# Patient Record
Sex: MALE | Race: WHITE | NOT HISPANIC OR LATINO
[De-identification: names, ages, dates, MRNs, and addresses within clinical notes are randomized per-mention and may not be internally consistent; named-entity substitution may affect disease eponyms.]

---

## 2019-06-03 PROBLEM — Z00.00 ENCOUNTER FOR PREVENTIVE HEALTH EXAMINATION: Status: ACTIVE | Noted: 2019-06-03

## 2019-08-14 ENCOUNTER — APPOINTMENT (OUTPATIENT)
Dept: INTERNAL MEDICINE | Facility: CLINIC | Age: 84
End: 2019-08-14
Payer: MEDICARE

## 2019-08-14 VITALS
SYSTOLIC BLOOD PRESSURE: 100 MMHG | WEIGHT: 165 LBS | DIASTOLIC BLOOD PRESSURE: 60 MMHG | HEART RATE: 91 BPM | OXYGEN SATURATION: 95 %

## 2019-08-14 PROCEDURE — 36415 COLL VENOUS BLD VENIPUNCTURE: CPT

## 2019-08-14 PROCEDURE — 99213 OFFICE O/P EST LOW 20 MIN: CPT | Mod: 25

## 2019-08-14 NOTE — PHYSICAL EXAM
[No Acute Distress] : no acute distress [Normal Sclera/Conjunctiva] : normal sclera/conjunctiva [Normal Outer Ear/Nose] : the outer ears and nose were normal in appearance [No JVD] : no jugular venous distention [No Lymphadenopathy] : no lymphadenopathy [No Respiratory Distress] : no respiratory distress  [No Accessory Muscle Use] : no accessory muscle use [Clear to Auscultation] : lungs were clear to auscultation bilaterally [Normal Rate] : normal rate  [No Edema] : there was no peripheral edema [No Extremity Clubbing/Cyanosis] : no extremity clubbing/cyanosis [Soft] : abdomen soft [Non Tender] : non-tender [Non-distended] : non-distended [de-identified] : vr 62

## 2019-08-14 NOTE — REVIEW OF SYSTEMS
[Fever] : no fever [Chills] : no chills [Chest Pain] : no chest pain [Claudication] : no  leg claudication [Palpitations] : no palpitations [Shortness Of Breath] : no shortness of breath [Wheezing] : no wheezing [FreeTextEntry7] : has 3 BM daily  no diarrhea [Abdominal Pain] : no abdominal pain

## 2019-08-16 LAB
ALBUMIN SERPL ELPH-MCNC: 4.3 G/DL
ALP BLD-CCNC: 90 U/L
ALT SERPL-CCNC: 16 U/L
ANION GAP SERPL CALC-SCNC: 13 MMOL/L
AST SERPL-CCNC: 19 U/L
BASOPHILS # BLD AUTO: 0.05 K/UL
BASOPHILS NFR BLD AUTO: 0.8 %
BILIRUB SERPL-MCNC: 0.6 MG/DL
BUN SERPL-MCNC: 19 MG/DL
CALCIUM SERPL-MCNC: 9.6 MG/DL
CHLORIDE SERPL-SCNC: 99 MMOL/L
CHOLEST SERPL-MCNC: 151 MG/DL
CHOLEST/HDLC SERPL: 2.3 RATIO
CO2 SERPL-SCNC: 22 MMOL/L
CREAT SERPL-MCNC: 0.91 MG/DL
EOSINOPHIL # BLD AUTO: 0.1 K/UL
EOSINOPHIL NFR BLD AUTO: 1.6 %
ESTIMATED AVERAGE GLUCOSE: 131 MG/DL
GLUCOSE SERPL-MCNC: 127 MG/DL
HBA1C MFR BLD HPLC: 6.2 %
HCT VFR BLD CALC: 37.9 %
HDLC SERPL-MCNC: 67 MG/DL
HGB BLD-MCNC: 12.2 G/DL
IMM GRANULOCYTES NFR BLD AUTO: 3.6 %
LDLC SERPL CALC-MCNC: 57 MG/DL
LYMPHOCYTES # BLD AUTO: 1.01 K/UL
LYMPHOCYTES NFR BLD AUTO: 15.9 %
MAN DIFF?: NORMAL
MCHC RBC-ENTMCNC: 32.2 GM/DL
MCHC RBC-ENTMCNC: 32.5 PG
MCV RBC AUTO: 101.1 FL
MONOCYTES # BLD AUTO: 0.67 K/UL
MONOCYTES NFR BLD AUTO: 10.6 %
NEUTROPHILS # BLD AUTO: 4.28 K/UL
NEUTROPHILS NFR BLD AUTO: 67.5 %
PLATELET # BLD AUTO: 238 K/UL
POTASSIUM SERPL-SCNC: 4.6 MMOL/L
PROT SERPL-MCNC: 6.9 G/DL
RBC # BLD: 3.75 M/UL
RBC # FLD: 13.8 %
SODIUM SERPL-SCNC: 134 MMOL/L
TRIGL SERPL-MCNC: 136 MG/DL
TSH SERPL-ACNC: 1.72 UIU/ML
WBC # FLD AUTO: 6.34 K/UL

## 2019-10-11 ENCOUNTER — APPOINTMENT (OUTPATIENT)
Dept: INTERNAL MEDICINE | Facility: CLINIC | Age: 84
End: 2019-10-11

## 2019-10-11 ENCOUNTER — APPOINTMENT (OUTPATIENT)
Dept: INTERNAL MEDICINE | Facility: CLINIC | Age: 84
End: 2019-10-11
Payer: MEDICARE

## 2019-10-11 PROCEDURE — G0008: CPT

## 2019-10-11 PROCEDURE — 90662 IIV NO PRSV INCREASED AG IM: CPT

## 2019-11-15 ENCOUNTER — RX RENEWAL (OUTPATIENT)
Age: 84
End: 2019-11-15

## 2019-11-15 ENCOUNTER — APPOINTMENT (OUTPATIENT)
Dept: INTERNAL MEDICINE | Facility: CLINIC | Age: 84
End: 2019-11-15
Payer: MEDICARE

## 2019-11-15 VITALS — SYSTOLIC BLOOD PRESSURE: 134 MMHG | DIASTOLIC BLOOD PRESSURE: 80 MMHG

## 2019-11-15 VITALS
BODY MASS INDEX: 25.9 KG/M2 | WEIGHT: 165 LBS | HEIGHT: 67 IN | OXYGEN SATURATION: 95 % | TEMPERATURE: 97.1 F | HEART RATE: 74 BPM

## 2019-11-15 DIAGNOSIS — Z82.49 FAMILY HISTORY OF ISCHEMIC HEART DISEASE AND OTHER DISEASES OF THE CIRCULATORY SYSTEM: ICD-10-CM

## 2019-11-15 PROCEDURE — 36415 COLL VENOUS BLD VENIPUNCTURE: CPT

## 2019-11-15 PROCEDURE — 99213 OFFICE O/P EST LOW 20 MIN: CPT | Mod: 25

## 2019-11-15 NOTE — REVIEW OF SYSTEMS
[Fever] : no fever [Chills] : no chills [Earache] : no earache [Sore Throat] : no sore throat [Hoarseness] : no hoarseness [Chest Pain] : no chest pain [Palpitations] : no palpitations [Lower Ext Edema] : no lower extremity edema [Shortness Of Breath] : no shortness of breath [Wheezing] : no wheezing [Cough] : no cough [Abdominal Pain] : no abdominal pain [Heartburn] : no heartburn

## 2019-11-15 NOTE — PHYSICAL EXAM
[No Acute Distress] : no acute distress [Normal Outer Ear/Nose] : the outer ears and nose were normal in appearance [No JVD] : no jugular venous distention [Supple] : supple [No Respiratory Distress] : no respiratory distress  [No Lymphadenopathy] : no lymphadenopathy [No Accessory Muscle Use] : no accessory muscle use [Clear to Auscultation] : lungs were clear to auscultation bilaterally [Normal Rate] : normal rate  [Regular Rhythm] : with a regular rhythm [Normal S1, S2] : normal S1 and S2 [No Extremity Clubbing/Cyanosis] : no extremity clubbing/cyanosis [No Edema] : there was no peripheral edema [Non Tender] : non-tender [Soft] : abdomen soft [Non-distended] : non-distended

## 2019-11-16 LAB
ALBUMIN SERPL ELPH-MCNC: 4 G/DL
ALP BLD-CCNC: 88 U/L
ALT SERPL-CCNC: 13 U/L
ANION GAP SERPL CALC-SCNC: 14 MMOL/L
AST SERPL-CCNC: 18 U/L
BASOPHILS # BLD AUTO: 0.04 K/UL
BASOPHILS NFR BLD AUTO: 0.6 %
BILIRUB SERPL-MCNC: 0.6 MG/DL
BUN SERPL-MCNC: 18 MG/DL
CALCIUM SERPL-MCNC: 9.3 MG/DL
CHLORIDE SERPL-SCNC: 97 MMOL/L
CHOLEST SERPL-MCNC: 143 MG/DL
CHOLEST/HDLC SERPL: 2.6 RATIO
CO2 SERPL-SCNC: 26 MMOL/L
CREAT SERPL-MCNC: 0.87 MG/DL
EOSINOPHIL # BLD AUTO: 0.11 K/UL
EOSINOPHIL NFR BLD AUTO: 1.5 %
GLUCOSE SERPL-MCNC: 124 MG/DL
HCT VFR BLD CALC: 37.9 %
HDLC SERPL-MCNC: 55 MG/DL
HGB BLD-MCNC: 12.1 G/DL
IMM GRANULOCYTES NFR BLD AUTO: 4.6 %
LDLC SERPL CALC-MCNC: 62 MG/DL
LYMPHOCYTES # BLD AUTO: 1.26 K/UL
LYMPHOCYTES NFR BLD AUTO: 17.7 %
MAN DIFF?: NORMAL
MCHC RBC-ENTMCNC: 31.9 GM/DL
MCHC RBC-ENTMCNC: 32.3 PG
MCV RBC AUTO: 101.1 FL
MONOCYTES # BLD AUTO: 0.85 K/UL
MONOCYTES NFR BLD AUTO: 12 %
NEUTROPHILS # BLD AUTO: 4.51 K/UL
NEUTROPHILS NFR BLD AUTO: 63.6 %
PLATELET # BLD AUTO: 270 K/UL
POTASSIUM SERPL-SCNC: 4.8 MMOL/L
PROT SERPL-MCNC: 6.8 G/DL
RBC # BLD: 3.75 M/UL
RBC # FLD: 13.2 %
SODIUM SERPL-SCNC: 137 MMOL/L
TRIGL SERPL-MCNC: 132 MG/DL
WBC # FLD AUTO: 7.1 K/UL

## 2019-11-18 ENCOUNTER — RX RENEWAL (OUTPATIENT)
Age: 84
End: 2019-11-18

## 2019-11-18 LAB — TSH SERPL-ACNC: 1.18 UIU/ML

## 2020-02-21 ENCOUNTER — APPOINTMENT (OUTPATIENT)
Dept: INTERNAL MEDICINE | Facility: CLINIC | Age: 85
End: 2020-02-21

## 2020-08-03 ENCOUNTER — APPOINTMENT (OUTPATIENT)
Dept: INTERNAL MEDICINE | Facility: CLINIC | Age: 85
End: 2020-08-03
Payer: MEDICARE

## 2020-08-03 VITALS
BODY MASS INDEX: 24.17 KG/M2 | HEART RATE: 90 BPM | WEIGHT: 154 LBS | OXYGEN SATURATION: 96 % | HEIGHT: 67 IN | RESPIRATION RATE: 16 BRPM

## 2020-08-03 VITALS — DIASTOLIC BLOOD PRESSURE: 78 MMHG | SYSTOLIC BLOOD PRESSURE: 132 MMHG

## 2020-08-03 PROCEDURE — 99213 OFFICE O/P EST LOW 20 MIN: CPT | Mod: 25

## 2020-08-03 PROCEDURE — G0444 DEPRESSION SCREEN ANNUAL: CPT | Mod: 59

## 2020-08-03 PROCEDURE — 36415 COLL VENOUS BLD VENIPUNCTURE: CPT

## 2020-08-03 NOTE — HEALTH RISK ASSESSMENT
[No] : In the past 12 months have you used drugs other than those required for medical reasons? No [No falls in past year] : Patient reported no falls in the past year [Assistive Device] : Patient uses an assistive device [0] : 2) Feeling down, depressed, or hopeless: Not at all (0) [] : No [Audit-CScore] : 0 [[id number]] : 2

## 2020-08-03 NOTE — REVIEW OF SYSTEMS
[Chills] : no chills [Fever] : no fever [Discharge] : no discharge [Vision Problems] : no vision problems [Chest Pain] : no chest pain [Palpitations] : no palpitations [Shortness Of Breath] : no shortness of breath [Lower Ext Edema] : no lower extremity edema [Wheezing] : no wheezing [Cough] : no cough [Abdominal Pain] : no abdominal pain

## 2020-08-03 NOTE — PHYSICAL EXAM
[No Acute Distress] : no acute distress [Normal Sclera/Conjunctiva] : normal sclera/conjunctiva [Normal Outer Ear/Nose] : the outer ears and nose were normal in appearance [No JVD] : no jugular venous distention [No Lymphadenopathy] : no lymphadenopathy [No Accessory Muscle Use] : no accessory muscle use [No Respiratory Distress] : no respiratory distress  [Clear to Auscultation] : lungs were clear to auscultation bilaterally [Normal Rate] : normal rate  [Regular Rhythm] : with a regular rhythm [Normal S1, S2] : normal S1 and S2 [No Extremity Clubbing/Cyanosis] : no extremity clubbing/cyanosis [No Edema] : there was no peripheral edema [Soft] : abdomen soft [Non Tender] : non-tender [Non-distended] : non-distended

## 2020-08-04 LAB
ALBUMIN SERPL ELPH-MCNC: 4.1 G/DL
ALP BLD-CCNC: 85 U/L
ALT SERPL-CCNC: 22 U/L
ANION GAP SERPL CALC-SCNC: 13 MMOL/L
AST SERPL-CCNC: 30 U/L
BASOPHILS # BLD AUTO: 0.02 K/UL
BASOPHILS NFR BLD AUTO: 0.3 %
BILIRUB SERPL-MCNC: 1.2 MG/DL
BUN SERPL-MCNC: 19 MG/DL
CALCIUM SERPL-MCNC: 9.3 MG/DL
CHLORIDE SERPL-SCNC: 97 MMOL/L
CHOLEST SERPL-MCNC: 111 MG/DL
CHOLEST/HDLC SERPL: 1.8 RATIO
CO2 SERPL-SCNC: 23 MMOL/L
CREAT SERPL-MCNC: 0.87 MG/DL
EOSINOPHIL # BLD AUTO: 0.05 K/UL
EOSINOPHIL NFR BLD AUTO: 0.7 %
ESTIMATED AVERAGE GLUCOSE: 117 MG/DL
GLUCOSE SERPL-MCNC: 127 MG/DL
HBA1C MFR BLD HPLC: 5.7 %
HCT VFR BLD CALC: 39.8 %
HDLC SERPL-MCNC: 63 MG/DL
HGB BLD-MCNC: 12.8 G/DL
IMM GRANULOCYTES NFR BLD AUTO: 1.9 %
LDLC SERPL CALC-MCNC: 28 MG/DL
LYMPHOCYTES # BLD AUTO: 1.13 K/UL
LYMPHOCYTES NFR BLD AUTO: 15.4 %
MAN DIFF?: NORMAL
MCHC RBC-ENTMCNC: 32.2 GM/DL
MCHC RBC-ENTMCNC: 32.9 PG
MCV RBC AUTO: 102.3 FL
MONOCYTES # BLD AUTO: 0.71 K/UL
MONOCYTES NFR BLD AUTO: 9.7 %
NEUTROPHILS # BLD AUTO: 5.3 K/UL
NEUTROPHILS NFR BLD AUTO: 72 %
PLATELET # BLD AUTO: 207 K/UL
POTASSIUM SERPL-SCNC: 5.2 MMOL/L
PROT SERPL-MCNC: 6.5 G/DL
RBC # BLD: 3.89 M/UL
RBC # FLD: 14 %
SODIUM SERPL-SCNC: 134 MMOL/L
T4 FREE SERPL-MCNC: 1.4 NG/DL
TRIGL SERPL-MCNC: 100 MG/DL
TSH SERPL-ACNC: 1.16 UIU/ML
WBC # FLD AUTO: 7.35 K/UL

## 2020-10-13 ENCOUNTER — APPOINTMENT (OUTPATIENT)
Dept: INTERNAL MEDICINE | Facility: CLINIC | Age: 85
End: 2020-10-13
Payer: MEDICARE

## 2020-10-13 VITALS
OXYGEN SATURATION: 95 % | SYSTOLIC BLOOD PRESSURE: 130 MMHG | HEART RATE: 103 BPM | DIASTOLIC BLOOD PRESSURE: 68 MMHG | BODY MASS INDEX: 24.17 KG/M2 | WEIGHT: 154 LBS | TEMPERATURE: 98 F | HEIGHT: 67 IN

## 2020-10-13 DIAGNOSIS — Z23 ENCOUNTER FOR IMMUNIZATION: ICD-10-CM

## 2020-10-13 PROCEDURE — G0009: CPT

## 2020-10-13 PROCEDURE — 90662 IIV NO PRSV INCREASED AG IM: CPT

## 2020-10-13 PROCEDURE — 90732 PPSV23 VACC 2 YRS+ SUBQ/IM: CPT

## 2020-10-13 PROCEDURE — G0008: CPT

## 2020-10-13 PROCEDURE — 36415 COLL VENOUS BLD VENIPUNCTURE: CPT

## 2020-10-13 PROCEDURE — 99214 OFFICE O/P EST MOD 30 MIN: CPT | Mod: 25

## 2020-10-13 NOTE — PHYSICAL EXAM
[No Acute Distress] : no acute distress [Normal Sclera/Conjunctiva] : normal sclera/conjunctiva [Normal Outer Ear/Nose] : the outer ears and nose were normal in appearance [No JVD] : no jugular venous distention [No Lymphadenopathy] : no lymphadenopathy [Supple] : supple [No Respiratory Distress] : no respiratory distress  [No Accessory Muscle Use] : no accessory muscle use [Clear to Auscultation] : lungs were clear to auscultation bilaterally [No Extremity Clubbing/Cyanosis] : no extremity clubbing/cyanosis [Soft] : abdomen soft [Non Tender] : non-tender [Non-distended] : non-distended [de-identified] : angy rr  [de-identified] : bilateral 1  + ankle edema

## 2020-10-13 NOTE — HISTORY OF PRESENT ILLNESS
[de-identified] : Pt comes for FBW and med renewal and vaccinations  Has been having some ankle swelling but no sob or chest pain or palpitations

## 2020-10-13 NOTE — REVIEW OF SYSTEMS
[Lower Ext Edema] : lower extremity edema [Fever] : no fever [Chills] : no chills [Chest Pain] : no chest pain [Palpitations] : no palpitations [Shortness Of Breath] : no shortness of breath [Wheezing] : no wheezing [Cough] : no cough [Abdominal Pain] : no abdominal pain

## 2020-10-14 LAB
ALBUMIN SERPL ELPH-MCNC: 4 G/DL
ALP BLD-CCNC: 87 U/L
ALT SERPL-CCNC: 24 U/L
ANION GAP SERPL CALC-SCNC: 13 MMOL/L
AST SERPL-CCNC: 25 U/L
BASOPHILS # BLD AUTO: 0.03 K/UL
BASOPHILS NFR BLD AUTO: 0.3 %
BILIRUB SERPL-MCNC: 0.7 MG/DL
BUN SERPL-MCNC: 17 MG/DL
CALCIUM SERPL-MCNC: 9 MG/DL
CHLORIDE SERPL-SCNC: 98 MMOL/L
CHOLEST SERPL-MCNC: 122 MG/DL
CHOLEST/HDLC SERPL: 2 RATIO
CO2 SERPL-SCNC: 26 MMOL/L
CREAT SERPL-MCNC: 0.8 MG/DL
EOSINOPHIL # BLD AUTO: 0.07 K/UL
EOSINOPHIL NFR BLD AUTO: 0.8 %
ESTIMATED AVERAGE GLUCOSE: 114 MG/DL
GLUCOSE SERPL-MCNC: 105 MG/DL
HBA1C MFR BLD HPLC: 5.6 %
HCT VFR BLD CALC: 37.1 %
HDLC SERPL-MCNC: 62 MG/DL
HGB BLD-MCNC: 11.7 G/DL
IMM GRANULOCYTES NFR BLD AUTO: 1 %
LDLC SERPL CALC-MCNC: 38 MG/DL
LYMPHOCYTES # BLD AUTO: 0.9 K/UL
LYMPHOCYTES NFR BLD AUTO: 10.1 %
MAN DIFF?: NORMAL
MCHC RBC-ENTMCNC: 31.5 GM/DL
MCHC RBC-ENTMCNC: 33 PG
MCV RBC AUTO: 104.5 FL
MONOCYTES # BLD AUTO: 0.98 K/UL
MONOCYTES NFR BLD AUTO: 11 %
NEUTROPHILS # BLD AUTO: 6.86 K/UL
NEUTROPHILS NFR BLD AUTO: 76.8 %
PLATELET # BLD AUTO: 233 K/UL
POTASSIUM SERPL-SCNC: 4.1 MMOL/L
PROT SERPL-MCNC: 6.3 G/DL
RBC # BLD: 3.55 M/UL
RBC # FLD: 13.6 %
SODIUM SERPL-SCNC: 136 MMOL/L
TRIGL SERPL-MCNC: 111 MG/DL
TSH SERPL-ACNC: 1.52 UIU/ML
WBC # FLD AUTO: 8.93 K/UL

## 2021-01-13 ENCOUNTER — APPOINTMENT (OUTPATIENT)
Dept: INTERNAL MEDICINE | Facility: CLINIC | Age: 86
End: 2021-01-13
Payer: MEDICARE

## 2021-01-13 VITALS
BODY MASS INDEX: 24.17 KG/M2 | TEMPERATURE: 97.8 F | OXYGEN SATURATION: 98 % | HEART RATE: 74 BPM | WEIGHT: 154 LBS | HEIGHT: 67 IN

## 2021-01-13 VITALS — SYSTOLIC BLOOD PRESSURE: 128 MMHG | DIASTOLIC BLOOD PRESSURE: 68 MMHG

## 2021-01-13 DIAGNOSIS — F41.9 ANXIETY DISORDER, UNSPECIFIED: ICD-10-CM

## 2021-01-13 PROCEDURE — 99214 OFFICE O/P EST MOD 30 MIN: CPT | Mod: 25

## 2021-01-13 PROCEDURE — 36415 COLL VENOUS BLD VENIPUNCTURE: CPT

## 2021-01-13 NOTE — REVIEW OF SYSTEMS
[Fever] : no fever [Chills] : no chills [Chest Pain] : no chest pain [Palpitations] : no palpitations [Lower Ext Edema] : no lower extremity edema [Shortness Of Breath] : no shortness of breath [Wheezing] : no wheezing [Cough] : no cough [Abdominal Pain] : no abdominal pain [Depression] : no depression [Easy Bleeding] : no easy bleeding [Easy Bruising] : no easy bruising

## 2021-01-13 NOTE — HISTORY OF PRESENT ILLNESS
[de-identified] : Pt comes for FBW and med renewal  NO further leg swelling with twice weekly furosemide

## 2021-01-13 NOTE — PHYSICAL EXAM
[No Acute Distress] : no acute distress [No JVD] : no jugular venous distention [No Respiratory Distress] : no respiratory distress  [No Accessory Muscle Use] : no accessory muscle use [Clear to Auscultation] : lungs were clear to auscultation bilaterally [Normal Rate] : normal rate  [Regular Rhythm] : with a regular rhythm [No Edema] : there was no peripheral edema [No Extremity Clubbing/Cyanosis] : no extremity clubbing/cyanosis [Soft] : abdomen soft [Non Tender] : non-tender [Non-distended] : non-distended [No Masses] : no abdominal mass palpated [Normal Affect] : the affect was normal [de-identified] : dry skin of legs

## 2021-01-14 LAB
25(OH)D3 SERPL-MCNC: 31.9 NG/ML
ALBUMIN SERPL ELPH-MCNC: 3.8 G/DL
ALP BLD-CCNC: 93 U/L
ALT SERPL-CCNC: 16 U/L
ANION GAP SERPL CALC-SCNC: 13 MMOL/L
AST SERPL-CCNC: 19 U/L
BASOPHILS # BLD AUTO: 0.03 K/UL
BASOPHILS NFR BLD AUTO: 0.5 %
BILIRUB SERPL-MCNC: 1 MG/DL
BUN SERPL-MCNC: 19 MG/DL
CALCIUM SERPL-MCNC: 9.3 MG/DL
CHLORIDE SERPL-SCNC: 98 MMOL/L
CHOLEST SERPL-MCNC: 130 MG/DL
CO2 SERPL-SCNC: 25 MMOL/L
CREAT SERPL-MCNC: 0.99 MG/DL
EOSINOPHIL # BLD AUTO: 0.06 K/UL
EOSINOPHIL NFR BLD AUTO: 0.9 %
ESTIMATED AVERAGE GLUCOSE: 123 MG/DL
GLUCOSE SERPL-MCNC: 116 MG/DL
HBA1C MFR BLD HPLC: 5.9 %
HCT VFR BLD CALC: 36.2 %
HDLC SERPL-MCNC: 59 MG/DL
HGB BLD-MCNC: 11.4 G/DL
IMM GRANULOCYTES NFR BLD AUTO: 3.7 %
LDLC SERPL CALC-MCNC: 51 MG/DL
LYMPHOCYTES # BLD AUTO: 1.06 K/UL
LYMPHOCYTES NFR BLD AUTO: 16.2 %
MAN DIFF?: NORMAL
MCHC RBC-ENTMCNC: 31.5 GM/DL
MCHC RBC-ENTMCNC: 32.5 PG
MCV RBC AUTO: 103.1 FL
MONOCYTES # BLD AUTO: 0.81 K/UL
MONOCYTES NFR BLD AUTO: 12.4 %
NEUTROPHILS # BLD AUTO: 4.33 K/UL
NEUTROPHILS NFR BLD AUTO: 66.3 %
NONHDLC SERPL-MCNC: 71 MG/DL
PLATELET # BLD AUTO: 189 K/UL
POTASSIUM SERPL-SCNC: 4.9 MMOL/L
PROT SERPL-MCNC: 6.7 G/DL
RBC # BLD: 3.51 M/UL
RBC # FLD: 13.7 %
SODIUM SERPL-SCNC: 137 MMOL/L
TRIGL SERPL-MCNC: 100 MG/DL
TSH SERPL-ACNC: 1.65 UIU/ML
WBC # FLD AUTO: 6.53 K/UL

## 2021-03-27 ENCOUNTER — EMERGENCY (EMERGENCY)
Facility: HOSPITAL | Age: 86
LOS: 1 days | Discharge: HOME | End: 2021-03-27
Attending: STUDENT IN AN ORGANIZED HEALTH CARE EDUCATION/TRAINING PROGRAM | Admitting: STUDENT IN AN ORGANIZED HEALTH CARE EDUCATION/TRAINING PROGRAM
Payer: MEDICARE

## 2021-03-27 VITALS
WEIGHT: 179.9 LBS | DIASTOLIC BLOOD PRESSURE: 62 MMHG | TEMPERATURE: 98 F | SYSTOLIC BLOOD PRESSURE: 116 MMHG | OXYGEN SATURATION: 95 % | RESPIRATION RATE: 20 BRPM | HEART RATE: 95 BPM | HEIGHT: 71 IN

## 2021-03-27 VITALS
TEMPERATURE: 98 F | DIASTOLIC BLOOD PRESSURE: 62 MMHG | HEART RATE: 93 BPM | OXYGEN SATURATION: 97 % | SYSTOLIC BLOOD PRESSURE: 125 MMHG | RESPIRATION RATE: 19 BRPM

## 2021-03-27 PROCEDURE — 99284 EMERGENCY DEPT VISIT MOD MDM: CPT | Mod: 25

## 2021-03-27 PROCEDURE — 73030 X-RAY EXAM OF SHOULDER: CPT

## 2021-03-27 PROCEDURE — 99284 EMERGENCY DEPT VISIT MOD MDM: CPT

## 2021-03-27 PROCEDURE — 70450 CT HEAD/BRAIN W/O DYE: CPT

## 2021-03-27 PROCEDURE — 73030 X-RAY EXAM OF SHOULDER: CPT | Mod: 26,RT

## 2021-03-27 PROCEDURE — 70450 CT HEAD/BRAIN W/O DYE: CPT | Mod: 26,MH

## 2021-03-27 NOTE — ED PROVIDER NOTE - PATIENT PORTAL LINK FT
You can access the FollowMyHealth Patient Portal offered by [facility] by registering at the following website: [email or web address]. By joining [employer]’s FollowMyHealth portal, you will also be able to view your health information using other applications (apps) compatible with our system.

## 2021-03-27 NOTE — ED PROVIDER NOTE - PHYSICAL EXAMINATION
Head: NC/AT with full ROM of c-spine  Hips and pelvis stable  Full ROM of b/l UE and LE  Full ROM of b/l elbow, no deformity, swelling, or laceration.  Full ROM of b/l shoulder. no deformity, swelling, or ecchymosis.  Small abrasion to right elbow

## 2021-03-27 NOTE — ED PROVIDER NOTE - PROGRESS NOTE DETAILS
Results of x-ray and CT discussed with patient's daughter, Mayra, and copies of all reports given.  Mayra was made aware of CT showing 2cm meningioma.  She is aware that this must be followed up with PMD for repeat imaging.  Daughter expressed understanding

## 2021-03-27 NOTE — ED ADULT TRIAGE NOTE - CHIEF COMPLAINT QUOTE
Fell 1 hour ago. Unwitnessed. Daughter found pt besides the bed. C/O right shoulder, bilat elbow pain.

## 2021-03-27 NOTE — ED PROVIDER NOTE - CLINICAL SUMMARY MEDICAL DECISION MAKING FREE TEXT BOX
89 year old male with unwitnessed fall at home, c/o mild shoulder and elbow pain.  Not taking anticoagulants.  CT head, x-ray, analgesia, reassess

## 2021-03-27 NOTE — ED PROVIDER NOTE - CROS ED MUSC ALL NEG
Office : [phone]     Fax :[phone]       Nephrology Consult Note      Patient's Name: Sukhjinder Ervin  4:34 PM  2/3/2021    Reason for Consult:  KAMILA , hypernatremia       Requesting Physician:  Kar Cross MD      Chief Complaint:    Chief Complaint   Patient presents with    Altered Mental Status     pt brought in by private ems, from [facility]. Pt has recently +COVID, [address] reports altered mental status. Pt noted with sediment, cloudy urine in her garcia          History of Present iIlness:    Sukhjinder Ervin is a 80 [facility] [address]     MD       Assessment/Plan :      1. KAMILA Christelle Hammer         Electronically signed by: Valeriy Feliciano MD, 2/3/2021, 4:34 PM      [facility] [address]  Office : [phone]  Fax :[phone] - - -

## 2021-03-27 NOTE — ED ADULT NURSE NOTE - OBJECTIVE STATEMENT
Pt states fell out of bed when getting up to go to the bathroom. C/O right shoulder, bilat elbow pain. No obvious injuries. Presents to ER full ROM to all extremities, (+) pulses, skin intact, no bruising observed.

## 2021-03-27 NOTE — ED ADULT NURSE NOTE - NSIMPLEMENTINTERV_GEN_ALL_ED
Implemented All Fall with Harm Risk Interventions:  [place] to call system. Call bell, personal items and telephone within reach. Instruct patient to call for assistance. Room bathroom lighting operational. Non-slip footwear when patient is off stretcher. Physically safe environment: no spills, clutter or unnecessary equipment. Stretcher in lowest position, wheels locked, appropriate side rails in place. Provide visual cue, wrist band, yellow gown, etc. Monitor gait and stability. Monitor for mental status changes and reorient to person, place, and time. Review medications for side effects contributing to fall risk. Reinforce activity limits and safety measures with patient and family. Provide visual clues: red socks.

## 2021-03-27 NOTE — ED PROVIDER NOTE - CONSTITUTIONAL, MLM
normal... Well appearing, awake, alert, oriented to person, place, time/situation and in no apparent distress. Calm, cooperative, answers all questions appropriately

## 2021-03-27 NOTE — ED PROVIDER NOTE - NSFOLLOWUPINSTRUCTIONS_ED_ALL_ED_FT
Please follow up with your primary care doctor.  Rest, take Tylenol for pain, and apply ice to your shoulder.  Be sure to have the meningioma found on CT closely followed by your doctor.  Return to the ER for persistent pain, headache, altered mental status, weakness, fever, or any other concerns.

## 2021-03-27 NOTE — ED PROVIDER NOTE - CARDIAC, MLM
Normal rate, regular rhythm.  Heart sounds S1, S2.  No murmurs, rubs or gallops. No splinting, no bruising or swelling noted to chest

## 2021-03-27 NOTE — ED PROVIDER NOTE - CARE PLAN
Principal Discharge DX:	Fall from bed, initial encounter  Secondary Diagnosis:	Acute pain of right shoulder

## 2021-03-27 NOTE — ED PROVIDER NOTE - CARE PROVIDER_API CALL
Ramiro Clark)  Internal Medicine  [facility] [place] [address]  Phone: [phone]  Fax: [phone]  Follow Up Time:

## 2021-03-27 NOTE — ED PROVIDER NOTE - CHPI ED SYMPTOMS NEG
no bleeding/no confusion/no deformity/no fever/no loss of consciousness/no numbness/no tingling/no vomiting

## 2021-03-27 NOTE — ED PROVIDER NOTE - OBJECTIVE STATEMENT
89 year old male with a history of HTN, HLD, hypothyroid presents with unwitnessed fall at home.  Patient lives with his wife and daughter.  Daughter heard patient call out for help and found patient in his bedroom.  He was awake and on the floor beside his bed.  No obvious signs of trauma.  He is not on any anti-coagulation.  Daughter states he always ambulates with a walker.  Patient states he was getting up to use the bathroom when he fell.  Denies LOC.  He c/o mild b/l elbow pain and right shoulder pain.  PMD Dr. Hatfield

## 2021-04-20 ENCOUNTER — APPOINTMENT (OUTPATIENT)
Dept: INTERNAL MEDICINE | Facility: CLINIC | Age: 86
End: 2021-04-20
Payer: MEDICARE

## 2021-04-20 VITALS — HEART RATE: 84 BPM | OXYGEN SATURATION: 96 % | RESPIRATION RATE: 16 BRPM | TEMPERATURE: 97.1 F | HEIGHT: 67 IN

## 2021-04-20 VITALS — DIASTOLIC BLOOD PRESSURE: 68 MMHG | SYSTOLIC BLOOD PRESSURE: 128 MMHG

## 2021-04-20 PROCEDURE — 99214 OFFICE O/P EST MOD 30 MIN: CPT | Mod: 25

## 2021-04-20 PROCEDURE — 36415 COLL VENOUS BLD VENIPUNCTURE: CPT

## 2021-04-20 RX ORDER — LATANOPROST/PF 0.005 %
0.01 DROPS OPHTHALMIC (EYE)
Qty: 2 | Refills: 0 | Status: ACTIVE | COMMUNITY
Start: 2020-06-18

## 2021-04-20 NOTE — HISTORY OF PRESENT ILLNESS
[de-identified] : Pt comes for FBW and med renewal  ALlashaun pt was in ER because he fell out of bed  Had ct brain showed small meningioma  Daughter and pt do not want to investigate  Also pt had Dr De Oliveira remove chest skin CA which necessitates further removal Pt is refusing to go back  Aware of possibility of spread

## 2021-04-20 NOTE — REVIEW OF SYSTEMS
[Fever] : no fever [Chills] : no chills [Chest Pain] : no chest pain [Palpitations] : no palpitations [Lower Ext Edema] : no lower extremity edema [Shortness Of Breath] : no shortness of breath [Wheezing] : no wheezing [Cough] : no cough [Abdominal Pain] : no abdominal pain [de-identified] : healing skin lesion anterior middle of chest

## 2021-04-20 NOTE — PHYSICAL EXAM
[No Acute Distress] : no acute distress [Normal Sclera/Conjunctiva] : normal sclera/conjunctiva [Normal Outer Ear/Nose] : the outer ears and nose were normal in appearance [No JVD] : no jugular venous distention [No Lymphadenopathy] : no lymphadenopathy [No Respiratory Distress] : no respiratory distress  [No Accessory Muscle Use] : no accessory muscle use [Clear to Auscultation] : lungs were clear to auscultation bilaterally [No Edema] : there was no peripheral edema [No Extremity Clubbing/Cyanosis] : no extremity clubbing/cyanosis [Soft] : abdomen soft [Non Tender] : non-tender [Non-distended] : non-distended [Grossly Normal Strength/Tone] : grossly normal strength/tone [de-identified] :  vr 68 [de-identified] : superficial skin lesion secondary to bx

## 2021-04-21 LAB
ALBUMIN SERPL ELPH-MCNC: 3.7 G/DL
ALP BLD-CCNC: 89 U/L
ALT SERPL-CCNC: 11 U/L
ANION GAP SERPL CALC-SCNC: 10 MMOL/L
AST SERPL-CCNC: 15 U/L
BASOPHILS # BLD AUTO: 0.01 K/UL
BASOPHILS NFR BLD AUTO: 0.1 %
BILIRUB SERPL-MCNC: 0.8 MG/DL
BUN SERPL-MCNC: 13 MG/DL
CALCIUM SERPL-MCNC: 8.9 MG/DL
CHLORIDE SERPL-SCNC: 97 MMOL/L
CHOLEST SERPL-MCNC: 119 MG/DL
CO2 SERPL-SCNC: 26 MMOL/L
CREAT SERPL-MCNC: 0.79 MG/DL
EOSINOPHIL # BLD AUTO: 0.08 K/UL
EOSINOPHIL NFR BLD AUTO: 1.1 %
ESTIMATED AVERAGE GLUCOSE: 126 MG/DL
GLUCOSE SERPL-MCNC: 99 MG/DL
HBA1C MFR BLD HPLC: 6 %
HCT VFR BLD CALC: 33.6 %
HDLC SERPL-MCNC: 54 MG/DL
HGB BLD-MCNC: 10.8 G/DL
IMM GRANULOCYTES NFR BLD AUTO: 1.6 %
LDLC SERPL CALC-MCNC: 44 MG/DL
LYMPHOCYTES # BLD AUTO: 1.21 K/UL
LYMPHOCYTES NFR BLD AUTO: 17.4 %
MAN DIFF?: NORMAL
MCHC RBC-ENTMCNC: 32.1 GM/DL
MCHC RBC-ENTMCNC: 32.6 PG
MCV RBC AUTO: 101.5 FL
MONOCYTES # BLD AUTO: 0.9 K/UL
MONOCYTES NFR BLD AUTO: 12.9 %
NEUTROPHILS # BLD AUTO: 4.65 K/UL
NEUTROPHILS NFR BLD AUTO: 66.9 %
NONHDLC SERPL-MCNC: 66 MG/DL
PLATELET # BLD AUTO: 177 K/UL
POTASSIUM SERPL-SCNC: 4.7 MMOL/L
PROT SERPL-MCNC: 6.2 G/DL
RBC # BLD: 3.31 M/UL
RBC # FLD: 14.7 %
SODIUM SERPL-SCNC: 134 MMOL/L
T4 FREE SERPL-MCNC: 1.4 NG/DL
TRIGL SERPL-MCNC: 110 MG/DL
TSH SERPL-ACNC: 1.88 UIU/ML
WBC # FLD AUTO: 6.96 K/UL

## 2021-05-10 ENCOUNTER — NON-APPOINTMENT (OUTPATIENT)
Age: 86
End: 2021-05-10

## 2021-07-27 ENCOUNTER — APPOINTMENT (OUTPATIENT)
Dept: INTERNAL MEDICINE | Facility: CLINIC | Age: 86
End: 2021-07-27
Payer: MEDICARE

## 2021-07-27 VITALS — OXYGEN SATURATION: 92 % | TEMPERATURE: 97 F | HEART RATE: 77 BPM

## 2021-07-27 VITALS — DIASTOLIC BLOOD PRESSURE: 68 MMHG | SYSTOLIC BLOOD PRESSURE: 128 MMHG

## 2021-07-27 DIAGNOSIS — R73.9 HYPERGLYCEMIA, UNSPECIFIED: ICD-10-CM

## 2021-07-27 DIAGNOSIS — R29.898 OTHER SYMPTOMS AND SIGNS INVOLVING THE MUSCULOSKELETAL SYSTEM: ICD-10-CM

## 2021-07-27 DIAGNOSIS — K21.9 GASTRO-ESOPHAGEAL REFLUX DISEASE W/OUT ESOPHAGITIS: ICD-10-CM

## 2021-07-27 PROCEDURE — 99214 OFFICE O/P EST MOD 30 MIN: CPT | Mod: 25

## 2021-07-27 PROCEDURE — 36415 COLL VENOUS BLD VENIPUNCTURE: CPT

## 2021-07-27 NOTE — HISTORY OF PRESENT ILLNESS
[de-identified] : Pt comes for med renewal  Did PT for a while   Feels slightly stronger but still weak  Fell 4 days ago at 530 am and hit buttocks on right side  Bruise with discomfort but not severe  Wants to try and stop fluoxetine as he is not depressed

## 2021-07-27 NOTE — REVIEW OF SYSTEMS
[Dyspnea on Exertion] : dyspnea on exertion [Joint Pain] : joint pain [Joint Stiffness] : joint stiffness [Fever] : no fever [Earache] : no earache [Chest Pain] : no chest pain [Palpitations] : no palpitations [Lower Ext Edema] : no lower extremity edema [Shortness Of Breath] : no shortness of breath [Wheezing] : no wheezing [Cough] : no cough [Abdominal Pain] : no abdominal pain [FreeTextEntry9] : strength improved

## 2021-07-27 NOTE — PHYSICAL EXAM
[No Acute Distress] : no acute distress [Normal Sclera/Conjunctiva] : normal sclera/conjunctiva [Normal Outer Ear/Nose] : the outer ears and nose were normal in appearance [No JVD] : no jugular venous distention [No Lymphadenopathy] : no lymphadenopathy [No Respiratory Distress] : no respiratory distress  [No Accessory Muscle Use] : no accessory muscle use [Clear to Auscultation] : lungs were clear to auscultation bilaterally [No Edema] : there was no peripheral edema [No Extremity Clubbing/Cyanosis] : no extremity clubbing/cyanosis [Soft] : abdomen soft [Non Tender] : non-tender [Non-distended] : non-distended [No Spinal Tenderness] : no spinal tenderness [de-identified] :  vr 68 [de-identified] : decreased leg strength right worse than left

## 2021-07-28 LAB
ALBUMIN SERPL ELPH-MCNC: 3.9 G/DL
ALP BLD-CCNC: 88 U/L
ALT SERPL-CCNC: 13 U/L
ANION GAP SERPL CALC-SCNC: 14 MMOL/L
AST SERPL-CCNC: 17 U/L
BASOPHILS # BLD AUTO: 0.03 K/UL
BASOPHILS NFR BLD AUTO: 0.4 %
BILIRUB SERPL-MCNC: 0.8 MG/DL
BUN SERPL-MCNC: 15 MG/DL
CALCIUM SERPL-MCNC: 9.1 MG/DL
CHLORIDE SERPL-SCNC: 96 MMOL/L
CHOLEST SERPL-MCNC: 137 MG/DL
CO2 SERPL-SCNC: 23 MMOL/L
CREAT SERPL-MCNC: 0.78 MG/DL
EOSINOPHIL # BLD AUTO: 0.05 K/UL
EOSINOPHIL NFR BLD AUTO: 0.7 %
ESTIMATED AVERAGE GLUCOSE: 123 MG/DL
GLUCOSE SERPL-MCNC: 107 MG/DL
HBA1C MFR BLD HPLC: 5.9 %
HCT VFR BLD CALC: 35.1 %
HDLC SERPL-MCNC: 54 MG/DL
HGB BLD-MCNC: 11.2 G/DL
IMM GRANULOCYTES NFR BLD AUTO: 3.6 %
LDLC SERPL CALC-MCNC: 51 MG/DL
LYMPHOCYTES # BLD AUTO: 1.07 K/UL
LYMPHOCYTES NFR BLD AUTO: 14.4 %
MAN DIFF?: NORMAL
MCHC RBC-ENTMCNC: 31.9 GM/DL
MCHC RBC-ENTMCNC: 32.2 PG
MCV RBC AUTO: 100.9 FL
MONOCYTES # BLD AUTO: 0.96 K/UL
MONOCYTES NFR BLD AUTO: 12.9 %
NEUTROPHILS # BLD AUTO: 5.06 K/UL
NEUTROPHILS NFR BLD AUTO: 68 %
NONHDLC SERPL-MCNC: 84 MG/DL
PLATELET # BLD AUTO: 245 K/UL
POTASSIUM SERPL-SCNC: 4.5 MMOL/L
PROT SERPL-MCNC: 6.5 G/DL
RBC # BLD: 3.48 M/UL
RBC # FLD: 14.7 %
SODIUM SERPL-SCNC: 132 MMOL/L
TRIGL SERPL-MCNC: 161 MG/DL
TSH SERPL-ACNC: 1.43 UIU/ML
WBC # FLD AUTO: 7.44 K/UL

## 2021-10-29 ENCOUNTER — APPOINTMENT (OUTPATIENT)
Dept: INTERNAL MEDICINE | Facility: CLINIC | Age: 86
End: 2021-10-29

## 2021-10-29 RX ORDER — ATORVASTATIN CALCIUM 40 MG/1
40 TABLET, FILM COATED ORAL
Qty: 90 | Refills: 0 | Status: ACTIVE | COMMUNITY
Start: 2019-08-14 | End: 1900-01-01

## 2021-10-29 RX ORDER — FLUOXETINE HYDROCHLORIDE 10 MG/1
10 TABLET ORAL DAILY
Qty: 90 | Refills: 0 | Status: ACTIVE | COMMUNITY
Start: 2019-08-14 | End: 1900-01-01

## 2021-10-29 RX ORDER — LISINOPRIL 20 MG/1
20 TABLET ORAL DAILY
Qty: 90 | Refills: 0 | Status: ACTIVE | COMMUNITY
Start: 2019-08-14 | End: 1900-01-01

## 2021-10-29 RX ORDER — METOPROLOL SUCCINATE 25 MG/1
25 TABLET, EXTENDED RELEASE ORAL
Qty: 90 | Refills: 0 | Status: ACTIVE | COMMUNITY
Start: 2019-08-14 | End: 1900-01-01

## 2021-10-29 RX ORDER — LEVOTHYROXINE SODIUM 0.03 MG/1
25 TABLET ORAL DAILY
Qty: 90 | Refills: 0 | Status: ACTIVE | COMMUNITY
Start: 2019-08-14 | End: 1900-01-01

## 2021-10-29 RX ORDER — FUROSEMIDE 20 MG/1
20 TABLET ORAL
Qty: 20 | Refills: 0 | Status: ACTIVE | COMMUNITY
Start: 2020-10-13 | End: 1900-01-01

## 2021-10-29 RX ORDER — FINASTERIDE 5 MG/1
5 TABLET, FILM COATED ORAL DAILY
Qty: 90 | Refills: 0 | Status: ACTIVE | COMMUNITY
Start: 2019-08-14 | End: 1900-01-01

## 2021-10-29 RX ORDER — OMEPRAZOLE 20 MG/1
20 CAPSULE, DELAYED RELEASE ORAL
Qty: 90 | Refills: 0 | Status: ACTIVE | COMMUNITY
Start: 2019-08-14 | End: 1900-01-01

## 2021-11-05 ENCOUNTER — APPOINTMENT (OUTPATIENT)
Dept: INTERNAL MEDICINE | Facility: CLINIC | Age: 86
End: 2021-11-05
Payer: MEDICARE

## 2021-11-05 VITALS — SYSTOLIC BLOOD PRESSURE: 128 MMHG | DIASTOLIC BLOOD PRESSURE: 68 MMHG

## 2021-11-05 DIAGNOSIS — N40.0 BENIGN PROSTATIC HYPERPLASIA WITHOUT LOWER URINARY TRACT SYMPMS: ICD-10-CM

## 2021-11-05 DIAGNOSIS — R60.0 LOCALIZED EDEMA: ICD-10-CM

## 2021-11-05 DIAGNOSIS — E03.9 HYPOTHYROIDISM, UNSPECIFIED: ICD-10-CM

## 2021-11-05 DIAGNOSIS — I10 ESSENTIAL (PRIMARY) HYPERTENSION: ICD-10-CM

## 2021-11-05 DIAGNOSIS — E78.5 HYPERLIPIDEMIA, UNSPECIFIED: ICD-10-CM

## 2021-11-05 PROCEDURE — 90662 IIV NO PRSV INCREASED AG IM: CPT

## 2021-11-05 PROCEDURE — G0008: CPT

## 2021-11-05 PROCEDURE — 99348 HOME/RES VST EST LOW MDM 30: CPT | Mod: 25

## 2021-11-05 NOTE — HISTORY OF PRESENT ILLNESS
[de-identified] : Pt was seen as a home visit as he was unable to travel without assistance and grandson is receiveing chemo  Pt feels well except occassional pedal swelling

## 2021-11-05 NOTE — REVIEW OF SYSTEMS
[Lower Ext Edema] : lower extremity edema [Joint Pain] : joint pain [Joint Stiffness] : joint stiffness [Fever] : no fever [Chills] : no chills [Chest Pain] : no chest pain [Palpitations] : no palpitations [Shortness Of Breath] : no shortness of breath [Wheezing] : no wheezing [Cough] : no cough [Vomiting] : no vomiting [Dysuria] : no dysuria [Joint Swelling] : no joint swelling

## 2021-11-05 NOTE — PHYSICAL EXAM
[No Acute Distress] : no acute distress [Normal Sclera/Conjunctiva] : normal sclera/conjunctiva [Normal Outer Ear/Nose] : the outer ears and nose were normal in appearance [No JVD] : no jugular venous distention [No Respiratory Distress] : no respiratory distress  [No Accessory Muscle Use] : no accessory muscle use [Clear to Auscultation] : lungs were clear to auscultation bilaterally [Normal Rate] : normal rate  [Regular Rhythm] : with a regular rhythm [No Extremity Clubbing/Cyanosis] : no extremity clubbing/cyanosis [Soft] : abdomen soft [Non Tender] : non-tender [Non-distended] : non-distended [Speech Grossly Normal] : speech grossly normal [Normal Mood] : the mood was normal [de-identified] : trace pedal edema

## 2021-12-17 ENCOUNTER — INPATIENT (INPATIENT)
Facility: HOSPITAL | Age: 86
LOS: 3 days | Discharge: HOSPICE | DRG: 193 | End: 2021-12-21
Attending: HOSPITALIST | Admitting: HOSPITALIST
Payer: MEDICARE

## 2021-12-17 VITALS
DIASTOLIC BLOOD PRESSURE: 74 MMHG | HEIGHT: 71 IN | HEART RATE: 108 BPM | RESPIRATION RATE: 24 BRPM | OXYGEN SATURATION: 97 % | TEMPERATURE: 100 F | SYSTOLIC BLOOD PRESSURE: 146 MMHG | WEIGHT: 149.91 LBS

## 2021-12-17 DIAGNOSIS — J12.9 VIRAL PNEUMONIA, UNSPECIFIED: ICD-10-CM

## 2021-12-17 LAB
ALBUMIN SERPL ELPH-MCNC: 2.8 G/DL — LOW (ref 3.3–5)
ALBUMIN SERPL ELPH-MCNC: 3.1 G/DL — LOW (ref 3.3–5)
ALP SERPL-CCNC: 80 U/L — SIGNIFICANT CHANGE UP (ref 30–120)
ALP SERPL-CCNC: 86 U/L — SIGNIFICANT CHANGE UP (ref 30–120)
ALT FLD-CCNC: 22 U/L DA — SIGNIFICANT CHANGE UP (ref 10–60)
ALT FLD-CCNC: 26 U/L DA — SIGNIFICANT CHANGE UP (ref 10–60)
ANION GAP SERPL CALC-SCNC: 12 MMOL/L — SIGNIFICANT CHANGE UP (ref 5–17)
ANION GAP SERPL CALC-SCNC: 9 MMOL/L — SIGNIFICANT CHANGE UP (ref 5–17)
APTT BLD: 29.4 SEC — SIGNIFICANT CHANGE UP (ref 27.5–35.5)
AST SERPL-CCNC: 23 U/L — SIGNIFICANT CHANGE UP (ref 10–40)
AST SERPL-CCNC: 31 U/L — SIGNIFICANT CHANGE UP (ref 10–40)
B PERT DNA SPEC QL NAA+PROBE: SIGNIFICANT CHANGE UP
BASE EXCESS BLDA CALC-SCNC: -2.4 MMOL/L — LOW (ref -2–3)
BASOPHILS # BLD AUTO: 0 K/UL — SIGNIFICANT CHANGE UP (ref 0–0.2)
BASOPHILS # BLD AUTO: 0.02 K/UL — SIGNIFICANT CHANGE UP (ref 0–0.2)
BASOPHILS NFR BLD AUTO: 0 % — SIGNIFICANT CHANGE UP (ref 0–2)
BASOPHILS NFR BLD AUTO: 0.2 % — SIGNIFICANT CHANGE UP (ref 0–2)
BILIRUB SERPL-MCNC: 0.5 MG/DL — SIGNIFICANT CHANGE UP (ref 0.2–1.2)
BILIRUB SERPL-MCNC: 0.6 MG/DL — SIGNIFICANT CHANGE UP (ref 0.2–1.2)
BLOOD GAS COMMENTS ARTERIAL: SIGNIFICANT CHANGE UP
BLOOD GAS COMMENTS ARTERIAL: SIGNIFICANT CHANGE UP
BUN SERPL-MCNC: 15 MG/DL — SIGNIFICANT CHANGE UP (ref 7–23)
BUN SERPL-MCNC: 19 MG/DL — SIGNIFICANT CHANGE UP (ref 7–23)
C PNEUM DNA SPEC QL NAA+PROBE: SIGNIFICANT CHANGE UP
CALCIUM SERPL-MCNC: 8.2 MG/DL — LOW (ref 8.4–10.5)
CALCIUM SERPL-MCNC: 8.2 MG/DL — LOW (ref 8.4–10.5)
CHLORIDE SERPL-SCNC: 94 MMOL/L — LOW (ref 96–108)
CHLORIDE SERPL-SCNC: 98 MMOL/L — SIGNIFICANT CHANGE UP (ref 96–108)
CO2 SERPL-SCNC: 24 MMOL/L — SIGNIFICANT CHANGE UP (ref 22–31)
CO2 SERPL-SCNC: 26 MMOL/L — SIGNIFICANT CHANGE UP (ref 22–31)
CREAT SERPL-MCNC: 0.78 MG/DL — SIGNIFICANT CHANGE UP (ref 0.5–1.3)
CREAT SERPL-MCNC: 0.8 MG/DL — SIGNIFICANT CHANGE UP (ref 0.5–1.3)
D DIMER BLD IA.RAPID-MCNC: 318 NG/ML DDU — HIGH
EOSINOPHIL # BLD AUTO: 0 K/UL — SIGNIFICANT CHANGE UP (ref 0–0.5)
EOSINOPHIL # BLD AUTO: 0.01 K/UL — SIGNIFICANT CHANGE UP (ref 0–0.5)
EOSINOPHIL NFR BLD AUTO: 0 % — SIGNIFICANT CHANGE UP (ref 0–6)
EOSINOPHIL NFR BLD AUTO: 0.1 % — SIGNIFICANT CHANGE UP (ref 0–6)
FLUAV H1 2009 PAND RNA SPEC QL NAA+PROBE: SIGNIFICANT CHANGE UP
FLUAV H1 RNA SPEC QL NAA+PROBE: SIGNIFICANT CHANGE UP
FLUAV H3 RNA SPEC QL NAA+PROBE: SIGNIFICANT CHANGE UP
FLUAV SUBTYP SPEC NAA+PROBE: SIGNIFICANT CHANGE UP
FLUBV RNA SPEC QL NAA+PROBE: SIGNIFICANT CHANGE UP
GAS PNL BLDA: SIGNIFICANT CHANGE UP
GLUCOSE SERPL-MCNC: 137 MG/DL — HIGH (ref 70–99)
GLUCOSE SERPL-MCNC: 99 MG/DL — SIGNIFICANT CHANGE UP (ref 70–99)
HADV DNA SPEC QL NAA+PROBE: SIGNIFICANT CHANGE UP
HCO3 BLDA-SCNC: 22 MMOL/L — SIGNIFICANT CHANGE UP (ref 21–28)
HCOV PNL SPEC NAA+PROBE: DETECTED
HCT VFR BLD CALC: 31.6 % — LOW (ref 39–50)
HCT VFR BLD CALC: 32.4 % — LOW (ref 39–50)
HGB BLD-MCNC: 10.5 G/DL — LOW (ref 13–17)
HGB BLD-MCNC: 10.6 G/DL — LOW (ref 13–17)
HMPV RNA SPEC QL NAA+PROBE: SIGNIFICANT CHANGE UP
HOROWITZ INDEX BLDA+IHG-RTO: 40 — SIGNIFICANT CHANGE UP
HPIV1 RNA SPEC QL NAA+PROBE: SIGNIFICANT CHANGE UP
HPIV2 RNA SPEC QL NAA+PROBE: SIGNIFICANT CHANGE UP
HPIV3 RNA SPEC QL NAA+PROBE: SIGNIFICANT CHANGE UP
HPIV4 RNA SPEC QL NAA+PROBE: SIGNIFICANT CHANGE UP
IMM GRANULOCYTES NFR BLD AUTO: 2.8 % — HIGH (ref 0–1.5)
INR BLD: 0.99 RATIO — SIGNIFICANT CHANGE UP (ref 0.88–1.16)
LACTATE SERPL-SCNC: 1.6 MMOL/L — SIGNIFICANT CHANGE UP (ref 0.7–2)
LACTATE SERPL-SCNC: 1.7 MMOL/L — SIGNIFICANT CHANGE UP (ref 0.7–2)
LIDOCAIN IGE QN: 169 U/L — SIGNIFICANT CHANGE UP (ref 73–393)
LYMPHOCYTES # BLD AUTO: 0.67 K/UL — LOW (ref 1–3.3)
LYMPHOCYTES # BLD AUTO: 1.39 K/UL — SIGNIFICANT CHANGE UP (ref 1–3.3)
LYMPHOCYTES # BLD AUTO: 10 % — LOW (ref 13–44)
LYMPHOCYTES # BLD AUTO: 6 % — LOW (ref 13–44)
MAGNESIUM SERPL-MCNC: 1.4 MG/DL — LOW (ref 1.6–2.6)
MAGNESIUM SERPL-MCNC: 2.1 MG/DL — SIGNIFICANT CHANGE UP (ref 1.6–2.6)
MCHC RBC-ENTMCNC: 32.7 GM/DL — SIGNIFICANT CHANGE UP (ref 32–36)
MCHC RBC-ENTMCNC: 32.8 PG — SIGNIFICANT CHANGE UP (ref 27–34)
MCHC RBC-ENTMCNC: 33.1 PG — SIGNIFICANT CHANGE UP (ref 27–34)
MCHC RBC-ENTMCNC: 33.2 GM/DL — SIGNIFICANT CHANGE UP (ref 32–36)
MCV RBC AUTO: 101.3 FL — HIGH (ref 80–100)
MCV RBC AUTO: 98.8 FL — SIGNIFICANT CHANGE UP (ref 80–100)
MONOCYTES # BLD AUTO: 1.9 K/UL — HIGH (ref 0–0.9)
MONOCYTES # BLD AUTO: 2.5 K/UL — HIGH (ref 0–0.9)
MONOCYTES NFR BLD AUTO: 17.1 % — HIGH (ref 2–14)
MONOCYTES NFR BLD AUTO: 18 % — HIGH (ref 2–14)
NEUTROPHILS # BLD AUTO: 8.23 K/UL — HIGH (ref 1.8–7.4)
NEUTROPHILS # BLD AUTO: 9.99 K/UL — HIGH (ref 1.8–7.4)
NEUTROPHILS NFR BLD AUTO: 72 % — SIGNIFICANT CHANGE UP (ref 43–77)
NEUTROPHILS NFR BLD AUTO: 73.8 % — SIGNIFICANT CHANGE UP (ref 43–77)
NRBC # BLD: 0 /100 WBCS — SIGNIFICANT CHANGE UP (ref 0–0)
NRBC # BLD: SIGNIFICANT CHANGE UP /100 WBCS (ref 0–0)
NT-PROBNP SERPL-SCNC: 834 PG/ML — HIGH (ref 0–450)
PCO2 BLDA: 35 MMHG — SIGNIFICANT CHANGE UP (ref 35–48)
PH BLDA: 7.41 — SIGNIFICANT CHANGE UP (ref 7.35–7.45)
PHOSPHATE SERPL-MCNC: 3.3 MG/DL — SIGNIFICANT CHANGE UP (ref 2.5–4.5)
PHOSPHATE SERPL-MCNC: 3.3 MG/DL — SIGNIFICANT CHANGE UP (ref 2.5–4.5)
PLATELET # BLD AUTO: 154 K/UL — SIGNIFICANT CHANGE UP (ref 150–400)
PLATELET # BLD AUTO: 157 K/UL — SIGNIFICANT CHANGE UP (ref 150–400)
PO2 BLDA: 158 MMHG — HIGH (ref 83–108)
POTASSIUM SERPL-MCNC: 4.1 MMOL/L — SIGNIFICANT CHANGE UP (ref 3.5–5.3)
POTASSIUM SERPL-MCNC: 4.9 MMOL/L — SIGNIFICANT CHANGE UP (ref 3.5–5.3)
POTASSIUM SERPL-SCNC: 4.1 MMOL/L — SIGNIFICANT CHANGE UP (ref 3.5–5.3)
POTASSIUM SERPL-SCNC: 4.9 MMOL/L — SIGNIFICANT CHANGE UP (ref 3.5–5.3)
PROCALCITONIN SERPL-MCNC: 0.1 NG/ML — SIGNIFICANT CHANGE UP (ref 0.02–0.1)
PROT SERPL-MCNC: 6.5 G/DL — SIGNIFICANT CHANGE UP (ref 6–8.3)
PROT SERPL-MCNC: 6.5 G/DL — SIGNIFICANT CHANGE UP (ref 6–8.3)
PROTHROM AB SERPL-ACNC: 12 SEC — SIGNIFICANT CHANGE UP (ref 10.6–13.6)
RAPID RVP RESULT: DETECTED
RBC # BLD: 3.2 M/UL — LOW (ref 4.2–5.8)
RBC # BLD: 3.2 M/UL — LOW (ref 4.2–5.8)
RBC # FLD: 14 % — SIGNIFICANT CHANGE UP (ref 10.3–14.5)
RBC # FLD: 14.1 % — SIGNIFICANT CHANGE UP (ref 10.3–14.5)
RV+EV RNA SPEC QL NAA+PROBE: SIGNIFICANT CHANGE UP
SAO2 % BLDA: 98.1 % — HIGH (ref 94–98)
SARS-COV-2 RNA SPEC QL NAA+PROBE: SIGNIFICANT CHANGE UP
SODIUM SERPL-SCNC: 131 MMOL/L — LOW (ref 135–145)
SODIUM SERPL-SCNC: 132 MMOL/L — LOW (ref 135–145)
TROPONIN I, HIGH SENSITIVITY RESULT: 13.6 NG/L — SIGNIFICANT CHANGE UP
WBC # BLD: 11.14 K/UL — HIGH (ref 3.8–10.5)
WBC # BLD: 13.87 K/UL — HIGH (ref 3.8–10.5)
WBC # FLD AUTO: 11.14 K/UL — HIGH (ref 3.8–10.5)
WBC # FLD AUTO: 13.87 K/UL — HIGH (ref 3.8–10.5)

## 2021-12-17 PROCEDURE — 99497 ADVNCD CARE PLAN 30 MIN: CPT

## 2021-12-17 PROCEDURE — 74177 CT ABD & PELVIS W/CONTRAST: CPT | Mod: 26,MA

## 2021-12-17 PROCEDURE — 93010 ELECTROCARDIOGRAM REPORT: CPT

## 2021-12-17 PROCEDURE — 93306 TTE W/DOPPLER COMPLETE: CPT | Mod: 26

## 2021-12-17 PROCEDURE — 99223 1ST HOSP IP/OBS HIGH 75: CPT | Mod: AI

## 2021-12-17 PROCEDURE — 99285 EMERGENCY DEPT VISIT HI MDM: CPT | Mod: CS

## 2021-12-17 PROCEDURE — 71045 X-RAY EXAM CHEST 1 VIEW: CPT | Mod: 26

## 2021-12-17 RX ORDER — SIMVASTATIN 20 MG/1
1 TABLET, FILM COATED ORAL
Qty: 0 | Refills: 0 | DISCHARGE

## 2021-12-17 RX ORDER — FINASTERIDE 5 MG/1
5 TABLET, FILM COATED ORAL DAILY
Refills: 0 | Status: DISCONTINUED | OUTPATIENT
Start: 2021-12-17 | End: 2021-12-21

## 2021-12-17 RX ORDER — ACETAMINOPHEN 500 MG
1000 TABLET ORAL ONCE
Refills: 0 | Status: COMPLETED | OUTPATIENT
Start: 2021-12-17 | End: 2021-12-17

## 2021-12-17 RX ORDER — METOPROLOL TARTRATE 50 MG
25 TABLET ORAL DAILY
Refills: 0 | Status: DISCONTINUED | OUTPATIENT
Start: 2021-12-17 | End: 2021-12-21

## 2021-12-17 RX ORDER — SODIUM CHLORIDE 9 MG/ML
2000 INJECTION INTRAMUSCULAR; INTRAVENOUS; SUBCUTANEOUS ONCE
Refills: 0 | Status: COMPLETED | OUTPATIENT
Start: 2021-12-17 | End: 2021-12-17

## 2021-12-17 RX ORDER — PANTOPRAZOLE SODIUM 20 MG/1
40 TABLET, DELAYED RELEASE ORAL DAILY
Refills: 0 | Status: DISCONTINUED | OUTPATIENT
Start: 2021-12-17 | End: 2021-12-21

## 2021-12-17 RX ORDER — LEVOTHYROXINE SODIUM 125 MCG
25 TABLET ORAL DAILY
Refills: 0 | Status: DISCONTINUED | OUTPATIENT
Start: 2021-12-17 | End: 2021-12-21

## 2021-12-17 RX ORDER — IPRATROPIUM/ALBUTEROL SULFATE 18-103MCG
3 AEROSOL WITH ADAPTER (GRAM) INHALATION ONCE
Refills: 0 | Status: COMPLETED | OUTPATIENT
Start: 2021-12-17 | End: 2021-12-17

## 2021-12-17 RX ORDER — ALBUTEROL 90 UG/1
2.5 AEROSOL, METERED ORAL EVERY 6 HOURS
Refills: 0 | Status: DISCONTINUED | OUTPATIENT
Start: 2021-12-17 | End: 2021-12-21

## 2021-12-17 RX ORDER — LANOLIN ALCOHOL/MO/W.PET/CERES
3 CREAM (GRAM) TOPICAL AT BEDTIME
Refills: 0 | Status: DISCONTINUED | OUTPATIENT
Start: 2021-12-17 | End: 2021-12-20

## 2021-12-17 RX ORDER — LISINOPRIL 2.5 MG/1
20 TABLET ORAL DAILY
Refills: 0 | Status: DISCONTINUED | OUTPATIENT
Start: 2021-12-17 | End: 2021-12-21

## 2021-12-17 RX ORDER — ONDANSETRON 8 MG/1
4 TABLET, FILM COATED ORAL EVERY 8 HOURS
Refills: 0 | Status: DISCONTINUED | OUTPATIENT
Start: 2021-12-17 | End: 2021-12-21

## 2021-12-17 RX ORDER — FLUOXETINE HCL 10 MG
1 CAPSULE ORAL
Qty: 0 | Refills: 0 | DISCHARGE

## 2021-12-17 RX ORDER — ENOXAPARIN SODIUM 100 MG/ML
40 INJECTION SUBCUTANEOUS DAILY
Refills: 0 | Status: DISCONTINUED | OUTPATIENT
Start: 2021-12-17 | End: 2021-12-19

## 2021-12-17 RX ORDER — FUROSEMIDE 40 MG
0 TABLET ORAL
Qty: 0 | Refills: 0 | DISCHARGE

## 2021-12-17 RX ORDER — MAGNESIUM SULFATE 500 MG/ML
2 VIAL (ML) INJECTION ONCE
Refills: 0 | Status: COMPLETED | OUTPATIENT
Start: 2021-12-17 | End: 2021-12-17

## 2021-12-17 RX ORDER — PANTOPRAZOLE SODIUM 20 MG/1
40 TABLET, DELAYED RELEASE ORAL
Refills: 0 | Status: DISCONTINUED | OUTPATIENT
Start: 2021-12-17 | End: 2021-12-17

## 2021-12-17 RX ORDER — ACETAMINOPHEN 500 MG
650 TABLET ORAL EVERY 6 HOURS
Refills: 0 | Status: DISCONTINUED | OUTPATIENT
Start: 2021-12-17 | End: 2021-12-21

## 2021-12-17 RX ORDER — DEXTROSE MONOHYDRATE, SODIUM CHLORIDE, AND POTASSIUM CHLORIDE 50; .745; 4.5 G/1000ML; G/1000ML; G/1000ML
1000 INJECTION, SOLUTION INTRAVENOUS
Refills: 0 | Status: DISCONTINUED | OUTPATIENT
Start: 2021-12-17 | End: 2021-12-19

## 2021-12-17 RX ORDER — ACETAMINOPHEN 500 MG
650 TABLET ORAL ONCE
Refills: 0 | Status: COMPLETED | OUTPATIENT
Start: 2021-12-17 | End: 2021-12-17

## 2021-12-17 RX ORDER — ATORVASTATIN CALCIUM 80 MG/1
40 TABLET, FILM COATED ORAL AT BEDTIME
Refills: 0 | Status: DISCONTINUED | OUTPATIENT
Start: 2021-12-17 | End: 2021-12-21

## 2021-12-17 RX ADMIN — Medication 650 MILLIGRAM(S): at 02:00

## 2021-12-17 RX ADMIN — Medication 3 MILLILITER(S): at 02:20

## 2021-12-17 RX ADMIN — ATORVASTATIN CALCIUM 40 MILLIGRAM(S): 80 TABLET, FILM COATED ORAL at 22:12

## 2021-12-17 RX ADMIN — SODIUM CHLORIDE 2000 MILLILITER(S): 9 INJECTION INTRAMUSCULAR; INTRAVENOUS; SUBCUTANEOUS at 01:55

## 2021-12-17 RX ADMIN — Medication 1000 MILLIGRAM(S): at 13:18

## 2021-12-17 RX ADMIN — Medication 0.5 MILLIGRAM(S): at 22:09

## 2021-12-17 RX ADMIN — Medication 40 MILLIGRAM(S): at 22:09

## 2021-12-17 RX ADMIN — ENOXAPARIN SODIUM 40 MILLIGRAM(S): 100 INJECTION SUBCUTANEOUS at 13:03

## 2021-12-17 RX ADMIN — Medication 40 MILLIGRAM(S): at 14:42

## 2021-12-17 RX ADMIN — Medication 0.5 MILLIGRAM(S): at 12:47

## 2021-12-17 RX ADMIN — DEXTROSE MONOHYDRATE, SODIUM CHLORIDE, AND POTASSIUM CHLORIDE 40 MILLILITER(S): 50; .745; 4.5 INJECTION, SOLUTION INTRAVENOUS at 15:30

## 2021-12-17 RX ADMIN — Medication 400 MILLIGRAM(S): at 13:03

## 2021-12-17 RX ADMIN — Medication 25 GRAM(S): at 06:37

## 2021-12-17 RX ADMIN — Medication 650 MILLIGRAM(S): at 01:54

## 2021-12-17 RX ADMIN — Medication 3 MILLIGRAM(S): at 22:13

## 2021-12-17 RX ADMIN — ALBUTEROL 2.5 MILLIGRAM(S): 90 AEROSOL, METERED ORAL at 19:30

## 2021-12-17 RX ADMIN — SODIUM CHLORIDE 2000 MILLILITER(S): 9 INJECTION INTRAMUSCULAR; INTRAVENOUS; SUBCUTANEOUS at 03:00

## 2021-12-17 RX ADMIN — Medication 0.5 MILLIGRAM(S): at 20:25

## 2021-12-17 NOTE — ED ADULT NURSE NOTE - OBJECTIVE STATEMENT
Pt brought to the ED from home with reports of difficulty breathing and a fever. Pt with a thick congested non-productive cough. Pt denies any pain at this time, is speaking with clear speech.

## 2021-12-17 NOTE — H&P ADULT - NSHPPHYSICALEXAM_GEN_ALL_CORE
PHYSICAL EXAM:  Vital Signs Last 24 Hrs  T(C): 37.7 (17 Dec 2021 01:32), Max: 37.7 (17 Dec 2021 01:32)  T(F): 99.8 (17 Dec 2021 01:32), Max: 99.8 (17 Dec 2021 01:32)  HR: 95 (17 Dec 2021 04:07) (95 - 108)  BP: 155/69 (17 Dec 2021 02:00) (146/74 - 155/69)  BP(mean): --  RR: 22 (17 Dec 2021 02:00) (22 - 24)  SpO2: 98% (17 Dec 2021 04:07) (97% - 99%)    GENERAL:     elderly male on BiPAP in NAD  HEAD:     atraumatic, normocephalic  EYES:     EOMI  RESPIRATORY:     diminished but mostly clear on BiPAP (ED noted he had a lot of rales prior to being placed on BiPAP)  CARDIOVASCULAR:     regular rate and rhythm, no murmurs or rubs or gallops, 2+ peripheral pulses  GASTROINTESTINAL:     soft, nontender, nondistended, bowel sounds present  EXTREMITIES:     1+ pitting pedal edema  MUSCULOSKELETAL:     no joint deformities  NERVOUS SYSTEM:     moves all 4 extremities  PSYCH:     appropriate at times but does not know where he is

## 2021-12-17 NOTE — ED ADULT NURSE NOTE - NSSEPSISCRITERIAMET_ED_A_ED
After Visit Summary   1/5/2018    Nahid Lazaro    MRN: [id number]           Patient Information     Date Of Birth          2015        Visit Information        Provider Department      1/5/2018 3:30 PM Nic Phelan DO [facility]        Today's Diagnoses     Encounter for routine child health examination without abnormal findings    -  1    Need for vaccination          Care Instructions          Your [age] Year Old  Next Visit:  - Next Visit: When your child is 3 years old                                                                                             - Expect: Vision test, blood pressure check                  Here are some tips to help keep your two-year-old healthy, safe and happy!  The Department of Health recommends your child see a dentist yearly.  If your child has not received fluoride dental varnish to help prevent early cavities ask your provider about it.   Feeding:  - Many two-year-olds won't eat certain foods, or want to eat only one or two favorite foods.  Try to make meal times happy times.  Don't fight over food.  Give him a choice of different healthy foods and let him choose.   - Don't buy candy, soft drinks, imitation fruit drinks or fatty chips.  Offer healthy snacks like apples, bananas, oranges, nj crackers, applesauce and cheese.  - Your child should drink milk with 2% or less fat.  Safety:  - Small children should be in the rear seat using an approved and properly installed toddler car seat for every ride.  - Keep all household products and medicines put away, in high places, out of sight and out of reach of your child.  Post the number of the poison control center (1-284.235.2457) next to every telephone.    - Never leave your child alone near a bathtub, toilet, pail of water, wading or swimming pool, or around open or frozen bodies of water.  - Use a smoke detector in your home.  Change the batteries once a year and check to see that it works  once a month.  - Keep your hot water temperature below 120 F to prevent accidental burns.  Home Life:  - Discipline means  to teach .  Praise and hug your child for good behavior.  Distract your child if he is doing something you don't like or remove him from the problem situation.  Do not spank or yell hurtful words.  Use temporary time-out.  Put the child in a boring place, such as a corner of a room or chair.  Time-outs should last about 1 minute for each year of age.  - Most children are ready to be toilet trained by age 2  .  Hug him and praise him when he stays dry or uses the potty.  Do not punish him when he makes mistakes.  Be patient.  - Think about moving your child from a crib to a regular bed.  - Think about having your child meet your dentist.  - Call Early Childhood Family Education [phone] ([place])/[phone] ([place]) for information about classes and groups for parents and children.  Development:  - At 2 years your child can:  ? put three words together   ? listen to stories with pictures    ? run well  ? climb stairs  ? open doors  - Give your child:  ? chances to run, climb and explore  ? picture books - and read them to your child!   ? toys to put together  ? praise, hugs, affection    ADVICE FOR PARENTS   Your Child s Developing Smile       1. When will your child s teeth start to come in?     Usually baby teeth (primary teeth) begin to appear when the baby is between 6-12 months of age.     Most children have a full set of 20 primary teeth by the time they are 2 1/2 to 3 years.    The picture shows when you can expect your child s teeth to come in.   2. Why is it important to take care of your child s teeth (primary and permanent)?    Your child s teeth do at least six important things:     Allow your child to chew food.     Help your child speak clearly.     Guide permanent teeth into place.     Aid in formation of jaw and face.     Add to your child s good health and  self-esteem.     Make a beautiful smile!   3. When and how should you clean your child s mouth and teeth?     Wipe your child s gums daily even before the first tooth comes in.     Wipe your child s teeth with a clean, damp washcloth or gauze pad until you can effectively brush them (this will be at approximately 1 year of age).     The easiest way to do this is to sit down and place your child s head in your lap or lay your child on a dressing table or the floor in whatever position allows you to look easily into your child s mouth.     Teach your child to brush his/her teeth by showing her/him how to hold the brush (aiming especially where the tooth meets the gum line) and by demonstrating how you brush your teeth. Brushing should be done twice a day (on arising, preferably before breakfast, and at bed time). You should brush your child s teeth until your child is 4-5 years old and should supervise your child s brushing until your child is 8-9 years old. Before your child is 9 - 10 years old, close supervision is needed to make certain that all the teeth are brushed well and that your child does not swallow the toothpaste, and to teach him/her how to spit out the toothpaste and to rinse with tap water. By 9-10 years of age, children will usually have sufficient manual dexterity to clean their teeth thoroughly without supervision. Check with your child s medical provider to learn when you should start using fluoride toothpaste (a thin film (less than a pea-sized amount) only).   4. What can you do when your child begins teething?     When your child is teething, he/she may have sore gums, be restless and irritable, have difficulty sleeping or eating well, and have loose stools. Rub your child s gums with your thumb/finger or a cold washcloth or allow your child to chew on something cold, such as a chilled teething ring or a clean washcloth. To make your child more comfortable, give an appropriate dosage of the  non-aspirin medication you use when your child has a fever. If your child has more serious symptoms, visit her/his doctor.     Teething does not cause fever, ear infections, or long-term diarrhea. Remember: your child is teething from 4 - 5 months of age until at least 2 years of age so you can blame everything or nothing on teething.   5. What is early childhood caries?     Tooth decay in infants and -aged children is called  early childhood caries.  Tooth decay can occur soon after the teeth begin to appear and is caused by frequent and prolonged exposures of the teeth to liquids that contain sugar (e.g., breast milk, formula, sugar water, fruit juice, and other sweetened liquids) and, in the child with chronic illness, to sugar-containing liquid medications which are regularly taken for a long time.   6. What is dental plaque?     Plaque is a sticky film on the teeth that contains, among other things, bacteria (germs). It forms daily in the mouth and is hard to see because it is transparent. However, when enough has accumulated, it is visible as a yellowish-brown stain which becomes hard to remove by regular brushing.     Bacteria which live in plaque may be passed from primary caregiver (usually mother) to child through saliva. If you have had problems with your teeth (multiple caries), take special care not to transmit your saliva to your baby s mouth. Hence, do NOT wet the pacifier with your saliva; do NOT prechew or taste food and then put it in your child s mouth; do NOT kiss your child on the lips.     Plaque bacteria use sugar as their food. Even a very small amount of sugar is enough for plaque bacteria to produce acid. It is this acid that attacks the enamel of the tooth, causing the tooth to decay.     Frequent eating of sugar-containing foods or taking of sugar-containing liquid medications on a regular, chronic basis leads to frequent acid attacks on the teeth.   7. What is tooth decay?      If plaque is allowed to stay on the tooth instead of being removed, the acid formed by the bacteria within the plaque will cause the enamel to lose minerals (demineralization). The first visual evidence of demineralization is a  white spot  lesion, usually at the gum line. The white spot lesion can be reversed and the decay process stopped if minerals can be restored to the enamel (remineralization). This can happen if exposure to sugar-containing liquids becomes less frequent and/or if more fluoride is made available to the tooth.     If remineralization does not occur and decay continues, it will progress to cavitation which can only be repaired surgically (drilling and filling).     Cavity formation can be stopped by changing diet, practicing good oral hygiene and using fluoride. Once a cavity is formed, it can only be corrected by a dentist with a filling.   Tooth decay is an infectious disease which is PREVENTABLE.   8. How can tooth decay be prevented?     At least twice a day, wipe your child s mouth with a clean gauze pad or wet cloth.     Once your child s teeth start to come in, clean them by using a wet cloth, finger cot or a small, soft brush and a thin film (less than a pea-sized amount) of fluoride toothpaste. If your child is under the age of 2, ask your child s medical provider or dentist whether fluoride tooth paste should be used.     Teach your child how to brush when he/she seems ready to learn. Supervise brushing to age 8-9 to make sure your child is doing a thorough job and is not swallowing the toothpaste. By age 9-10, most children have sufficient manual dexterity to do it themselves without supervision.     Replace your child s toothbrush when the bristles flare, bend, or become frayed. Such bristles on a toothbrush will not remove plaque effectively and may injure gums.     If the teeth are touching and have no gaps between them, then you should also floss between them.     Start  teaching your child to drink out of a cup as soon as she/he has coordination of swallowing (about 10 months of age). The sooner your child is off the bottle, the less likely it is that your child will have cavities.     Don t give your child a bottle or  sippy  cup filled with a sweet liquid (e.g., juice, sweetened water, soda pop, milk) when putting him/her to sleep (nap or bedtime); instead, fill the bottle with plain tap water only. All other liquids should be used at meal-times only.     Never give your child a pacifier dipped in any sweet liquid, and don t put your child s pacifier in your mouth before placing it into your child s mouth. If you want to moisten it, use tap water     Use fluoride to strengthen the tooth enamel against decay. Fluoride is one of the most effective elements for preventing tooth decay and is therefore extremely important. The most effective way for your child to get fluoride s protection is by drinking plain tap water containing the right amount of the mineral (about one part fluoride per million parts water). Over 98% of public water in [place] is fluoridated (> 0.7-1.2 ppm fluoride); however, most well water does not contain enough fluoride naturally to prevent tooth decay. If you wonder whether your water supply is adequately fluoridated (> 0.7-1.2 ppm fluoride), ask your city, [facility], or state Health Department. If your water does not have enough fluoride you should consult your child s physician or dentist about a fluoride supplement. You should also talk to your child s physician or dentist about fluoride varnish treatments. Avoid giving your child bottled water or water that has been filtered (e.g., with a reverse osmosis (RO) filter), as neither may contain enough fluoride to keep your child s teeth healthy.     Keep your child on a healthy diet to maintain good dental and physical health. A child should eat a balanced diet, free from too many sweets. Provide nutritious  snacks that are low in sugar. Help your child develop good eating habits.     Help your child develop a positive attitude toward dental care. Your child s first visit to the dentist should be at around one year of age and then once every six months for checkups, or on whatever schedule your child s dentist recommends.   9. What can you do about your child s nutrition?     Choose healthy foods and maintain your child on a well-balanced diet to keep good dental and physical health.     Avoid giving your child foods high in sugar, such as soda pop, candies, sweetened cereals, fruit roll-ups, and pastries between meals.     Offer your child snacks that are low in sugar such as raw fruits and vegetables, pretzels, cheese, yogurt, and unsweetened applesauce.     Do not give your child a bottle or  sippy  cup filled with a sweet liquid (e.g., juice, sweetened water, soda pop, milk) when putting him/her to sleep (nap or bedtime); instead, fill the bottle with plain tap water only. Best of all, don t give any bottle at nap or bedtime; children will go to sleep without a bottle.     Help your child develop good eating habits.   10. When should you take your child for his/her first dental visit?     It is recommended that children visit the dentist around their first birthday.    The primary purpose of this visit is so the dentist or hygienist (or the medical provider if a dentist is not available) can inform you about risk of cavities, provide you with information (e.g., how to prevent common problems including decay and trauma, what to expect of tooth and bite development), examine your child s teeth, gums, and the rest of the mouth for abnormalities, refer to a dentist as necessary to ensure that your child gets started in the right direction toward good oral health, and show you how to care for your child s teeth and recommend how much fluoride your child should use.     If you think there is a problem, see the dentist at  once. DO NOT wait until your child is in pain!   11. Should your child use fluoride?     Fluoride is one of the most effective elements for preventing tooth decay and is therefore extremely important. The most effective way for your child to get fluoride s protection is by drinking water containing the right amount of the mineral (community water supplies that are fluoridated contain about one part fluoride per million parts water). Avoid giving your child bottled water or water that has been filtered (e.g., with a reverse osmosis (RO) filter); neither may contain enough fluoride to be effective against tooth decay.     It is also beneficial for your child to brush with a fluoride toothpaste (if your child is under 2 years of age, ask your medical provider or dentist about using fluoride toothpaste). If your child is 4-5 years old, you should do the brushing for her/him and you should make sure that the toothpaste is not swallowed. Though your child may be able to brush on his/her own once 4-5 years of age, you should supervise until your child is 8-9 to make certain that the teeth are brushed well and the toothpaste is not swallowed. By age 9-10, your child should have sufficient manual dexterity to brush unsupervised. A thin film (less than a pea-sized amount) of toothpaste should be placed on the child s toothbrush and the child should be taught to spit out the remaining toothpaste.     There are also fluoride treatments available at school-based programs, at the dentist  office, and at the office of your child s medical provider. Ask your child s medical provider which method he/she recommends for your child.   12. What are dental sealants?     Dental sealants are thin plastic coatings which protect the pits and fissures of the chewing surfaces of the back teeth (molars). These teeth appear around age 6 and are where most tooth decay occurs. Not every child needs sealants, so ask your child s dentist if sealants  are needed for your child.   13. When should your child get sealants?     If needed, sealants are applied when the first permanent molars (back teeth) erupt, usually around age 6-7 years.     Sometimes the dentist will apply sealants to the primary (baby) molars. Ask your dentist about this.   14. What is fluoride varnish?     Fluoride varnish is a liquid coating that is placed on the surfaces of teeth (just like nail polish on nails).     Fluoride varnish strengthens your child s teeth. Remember: the stronger the teeth are, the less chance that your child will get cavities.     Ask your child s dentist (or medical provider) whether your child should have a fluoride varnish treatment.   If fluoride varnish is applied to your child s teeth, the teeth will not look  as bright and shiny as usual after the treatment. They should look normal by the next day and the protective effect of the varnish will continue to work for several months. To achieve the best result:   Your child should eat only soft foods for the rest of the day.   Your child s teeth should not be brushed on the day the varnish is applied.   You may start brushing the next day in usual fashion.         Directions for Care After Fluoride Varnish    5% sodium fluoride varnish was applied to your child's teeth today. This treatment safely delivers fluoride and a protective coating to the tooth surfaces. To obtain maximum benefit, we ask that you follow these recommendations after you leave our office       Do not floss or brush for at least 4-6 hours    If possible, wait until tomorrow morning to resume normal oral hygiene    Avoid hot drinks and products containing alcohol (i.e.: beverages, oral rinses, etc.) during the treatment period (the morning after your fluoride application)    You will be able to see and feel the varnish on your teeth. At the completion of the treatment period, you may brush and floss to remove any remaining varnish  (the temporary  "faint yellow discoloration should resolve after a couple of days).             Follow-ups after your visit        Who to contact     Please call your clinic at [phone] to:    Ask questions about your health    Make or cancel appointments    Discuss your medicines    Learn about your test results    Speak to your doctor   If you have compliments or concerns about an experience at your clinic, or if you wish to file a complaint, please contact [facility] Patient Relations at [phone] or email us at [email or web address]         Additional Information About Your Visit        [employer] Information     [employer] is an electronic gateway that provides easy, online access to your medical records. With [employer], you can request a clinic appointment, read your test results, renew a prescription or communicate with your care team.     To sign up for [employer], please contact your [facility] Clinic or call [phone] for assistance.           Care EveryWhere ID     This is your Care EveryWhere ID. This could be used by other organizations to access your [facility] medical records  [id number]        Your Vitals Were     Temperature Height Head Circumference BMI (Body Mass Index)          99.1  F (37.3  C) (Tympanic) 3' 0.5\" (92.7 cm) 48.3 cm (19\") 15.3 kg/m2         Blood Pressure from Last 3 Encounters:   No data found for BP    Weight from Last 3 Encounters:   01/05/18 29 lb (13.2 kg) (63 %)*   03/06/17 24 lb (10.9 kg) (70 %)    02/16/17 24 lb 12.8 oz (11.2 kg) (81 %)      * Growth percentiles are based on CDC 2-20 Years data.     Growth percentiles are based on WHO (Girls, 0-2 years) data.              We Performed the Following     ADMIN VACCINE, EACH ADDITIONAL     ADMIN VACCINE, INITIAL     Autism screen (MCHAT) 83759     Developmental screen (PEDS) 04730     FLU VAC PRESRV FREE QUAD SPLIT VIR CHILD IM 0.25 mL dosage     HEPATITIS A VACCINE PED/ADOL-2 DOSE  "    TOPICAL FLUORIDE VARNISH        Primary Care Provider Office Phone # Fax #    Nic Phelan [phone]       [place] [address]        Equal Access to Services     BJORN LOMBARDO : Hadii aad ku hadnancyo Solanreali, waaxda luqadaha, qaybta kaalmada adecamden, boy lozoyacontreras bowen fannie yahir storm. So [facility] [phone].    ATENCIÓN: Si habla español, tiene a sosa disposición servicios gratuitos de asistencia lingüística. Llame al [phone].    We comply with applicable federal civil rights laws and [place] laws. We do not discriminate on the basis of race, color, national origin, age, disability, sex, sexual orientation, or gender identity.            Thank you!     Thank you for choosing [facility]  for your care. Our goal is always to provide you with excellent care. Hearing back from our patients is one way we can continue to improve our services. Please take a few minutes to complete the written survey that you may receive in the mail after your visit with us. Thank you!             Your Updated Medication List - Protect others around you: Learn how to safely use, store and throw away your medicines at www.disposemymeds.org.          This list is accurate as of: 1/5/18  4:24 PM.  Always use your most recent med list.                   Brand Name Dispense Instructions for use Diagnosis    acetaminophen 32 mg/mL solution    TYLENOL    120 mL    Take 5 mLs (160 mg) by mouth every 4 hours as needed for fever or mild pain    Cough       nystatin 273134 UNIT/ML suspension    MYCOSTATIN    60 mL    Take 5 mLs (500,000 Units) by mouth 4 times daily    Routine infant or child health check       POLY-Vi-SOL solution     50 mL    Take 1 mL by mouth daily    Routine infant or child health check          Abnormal VS & WBC

## 2021-12-17 NOTE — ED ADULT NURSE REASSESSMENT NOTE - NS ED NURSE REASSESS COMMENT FT1
22:20 - Dr. Dey at bedside on repair of skin tear to left forearm. Steri Strips applied, DSD, Melani applied. Pt tolerated procedure. Transferred to hospital bed. Bed alarm in place. Suctioned for small amount frothy sputum. Resp status stable. O2 sat % on BIPAP. Pt remains awaiting bed availability for admission.
While assisting nurse on providing perineal care, pt grabbed his left wrist while turning himself to the left side and scratched himself and skin tear noted. pt was agitated and was pulling on his bipap. Call placed to MD Cecilia Md came and observed the wound, meds ordered for agitation.   Md CAMP made aware as well.
pt appears to be in no acute distress. pt awaiting bed. safety maintained.
spoke with MD Duran. at this time PO medication is to be held. 8am and 9am PO medication held. Awaiting re-assessment of pt's swallowing ability and bipap order by MD. safety maintained.
Pt with increasing secretions and continuous cough following interventions. Pt is unable to clear independently, suctioned orally for some clear thick sputum. MD informed and Bipap ordered.

## 2021-12-17 NOTE — ED PROVIDER NOTE - EMPLOYMENT
PT DAILY TREATMENT NOTE  Patient Name: Rosalind Du Date:10/3/2018 : 1960 [x]  Patient  Verified Payor: Elyse Lima / Plan: [facility] [employer] [facility] / Product Type: PPO / In time: 3:33 pm                Out time: 4:28 pm 
Total Treatment Time (min): 55 Visit #: 6 of  Treatment Area: Right knee pain [M25.561] Acute postoperative pain of knee [G89.18, M25.569] SUBJECTIVE Pain Level (0-10 scale): 1-2 Any medication changes, allergies to medications, adverse drug reactions, diagnosis change, or new procedure performed?: [x] No    [] Yes (see summary sheet for update) Subjective functional status/changes:   [] No changes reported \"I am stiff. \" OBJECTIVE Modality rationale: decrease edema, decrease inflammation and decrease pain to improve the patients ability to improve gait, stairs Min Type Additional Details Clothes limitation  [x] Estim: []Att   []Unatt        []TENS instruct []IFC  []Premod   [x]NMES 8 min total, 5 sec on 10 off with ECC SAQ lowering 2#   
                 []Other:  []w/US   []w/ice   []w/heat Position: long sitting Location: R quad   
 []  Traction: [] Cervical       []Lumbar 
                     [] Prone          []Supine []Intermittent   []Continuous Lbs: 
[] before manual 
[] after manual  
 []  Ultrasound: []Continuous   [] Pulsed []1MHz   []3MHz Location: 
W/cm2:  
 []  Iontophoresis with dexamethasone Location: [] Take home patch  
[] In clinic  
 []  Ice     []  heat 
[]  Ice massage Position: Location:  
10 [x]  Vasopneumatic Device Pressure:       [] lo [x] med [] hi  
Temperature: [x] lo [] med [] hi  
[x] Skin assessment post-treatment:  [x]intact []redness- no adverse reaction 
     []redness  adverse reaction:  
 
34 min Therapeutic Exercise:  [x] See flow sheet Rationale: increase ROM, increase strength and improve coordination to improve the patients ability to improve gait, stairs, mobility 11 min Manual Therapy:  prone quad stretch, prone hip flexor stretch; EOB knee distraction, grade 3-4 joint mobs for flexion and extension, joint line deep palpation Rationale: decrease pain, increase ROM and increase tissue extensibility to improve gait, mobility, stairs X min Patient Education: [x] Review HEP    [] Progressed/Changed HEP based on:  
[] positioning   [] body mechanics   [] transfers   [] heat/ice application Other Objective/Functional Measures: AROM 110 deg Pain Level (0-10 scale) post treatment: 1 ASSESSMENT/Changes in Function:  
Improved concentric quad strength with step ups. Patient will continue to benefit from skilled PT services to modify and progress therapeutic interventions, address functional mobility deficits, address ROM deficits, address strength deficits, analyze and address soft tissue restrictions, analyze and cue movement patterns, analyze and modify body mechanics/ergonomics, assess and modify postural abnormalities, address imbalance/dizziness and instruct in home and community integration to attain remaining goals. []  See Plan of Care [x]  See progress note/recertification 
[]  See Discharge Summary Progress towards goals / Updated goals: 1.  Patient will be (I) with finalized HEP in preparation for D/C.goal progressing - patient reports fair tolerance to HEP after returning to work sec to [employer] 9/18/18 2.  Patient will demo increased AROM knee flexion to 120 for ease with transfers. Slow progress today due to 9 days between sessions (9/27/18) 3.  Patient will demo AROM knee extension to 0 deg for normalized gait. Goal me t- 0 deg ext 9/18/18 4.  Patient will demo 4/5 knee extension strength for ease with stair negotiation. Progressed with leg press (9/27/18) PLAN [x]  Upgrade activities as tolerated     [x]  Continue plan of care []  Update interventions per flow sheet      
[]  Discharge due to:_ 
[x]  Other:_   
 
Lupis Rico, ELSA 10/3/2018 N/A

## 2021-12-17 NOTE — H&P ADULT - NSHPREVIEWOFSYSTEMS_GEN_ALL_CORE
***Unable to obtain from patient 2/2 current medical condition and baseline confusion but as per daughter, only complaint was coughing.***

## 2021-12-17 NOTE — H&P ADULT - HISTORY OF PRESENT ILLNESS
***Patient with some baseline confusion as per daughter.  Patient also on BiPAP.  Therefore information is limited and collateral obtained from chart and daughter.***    89M with HTN, BLE edema, HLD, hypothyroidism, anxiety, BPH who presents for SOB.  Per daughter, patient has been coughing for the past few days.  No reported fevers.  Still had a good appetite and interacting as usual.  Then tonight patient was noted to be SOB with some audible rales.  Family got concerned and called EMS.  In the ED, patient's vitals were /74    RR 24, 97% on 3L with T 99.8.  Labs remarkable for leukocytosis of 11.1 and .  ABG showed 7.41/35/158.  Also was found to have coronavirus (but not COVID).  Patient was noted to be tachypneic with upper respiratory rales and wheezing.  ED tried nebs and suctioning but patient's respiratory status did not improve.  Therefore he was placed on BiPAP with symptomatic improvement.  ED noted that the patient was tender diffusely in his abdomen and a CT A/P was done and was unremarkable.  Currently patient says he feels fine with no breathing difficulties or pain anywhere.

## 2021-12-17 NOTE — CONSULT NOTE ADULT - ASSESSMENT
89M with HTN, BLE edema, HLD, hypothyroidism, anxiety, BPH who presents for SOB.  Found to have +coronavirus 89M with HTN, BLE edema, HLD, hypothyroidism, anxiety, BPH who presents for SOB.  Found to have +coronavirus    on bipap  will wean this am   alert - verbal -   cardio eval   TTE  URI - sx management -   CT abd - atelectasis - right adrenal nodule - diverticular disease  monitor VS and HD and Sat  SLP eval   ABG noted  GOC discussion  unclear Etiol of Resp Distress at present -

## 2021-12-17 NOTE — CONSULT NOTE ADULT - SUBJECTIVE AND OBJECTIVE BOX
Date/Time Patient Seen:  		  Referring MD:   Data Reviewed	       Patient is a 89y old  Male who presents with a chief complaint of hypoxia (17 Dec 2021 05:53)      Subjective/HPI   History and Physical:   Source of Information	Chart(s), Child  Outpatient Providers	Liu ABAD     Language:  · Patient/Family of Limited English Proficiency	No       Patient Identity:  · Birth Sex	Male     History of Present Illness:  Reason for Admission: hypoxia  History of Present Illness:   ***Patient with some baseline confusion as per daughter.  Patient also on BiPAP.  Therefore information is limited and collateral obtained from chart and daughter.***    89M with HTN, BLE edema, HLD, hypothyroidism, anxiety, BPH who presents for SOB.  Per daughter, patient has been coughing for the past few days.  No reported fevers.  Still had a good appetite and interacting as usual.  Then tonight patient was noted to be SOB with some audible rales.  Family got concerned and called EMS.  In the ED, patient's vitals were /74    RR 24, 97% on 3L with T 99.8.  Labs remarkable for leukocytosis of 11.1 and .  ABG showed 7.41/35/158.  Also was found to have coronavirus (but not COVID).  Patient was noted to be tachypneic with upper respiratory rales and wheezing.  ED tried nebs and suctioning but patient's respiratory status did not improve.  Therefore he was placed on BiPAP with symptomatic improvement.  ED noted that the patient was tender diffusely in his abdomen and a CT A/P was done and was unremarkable.  Currently patient says he feels fine with no breathing difficulties or pain anywhere.     PAST MEDICAL & SURGICAL HISTORY:  Hypertension    Hyperlipidemia    Hypothyroidism    Anxiety    History of BPH    GERD (gastroesophageal reflux disease)    Bilateral leg edema  was on furosemide but no longer taking          Medication list         MEDICATIONS  (STANDING):  atorvastatin 40 milliGRAM(s) Oral at bedtime  enoxaparin Injectable 40 milliGRAM(s) SubCutaneous daily  finasteride 5 milliGRAM(s) Oral daily  levothyroxine 25 MICROGram(s) Oral daily  lisinopril 20 milliGRAM(s) Oral daily  metoprolol succinate ER 25 milliGRAM(s) Oral daily  pantoprazole    Tablet 40 milliGRAM(s) Oral before breakfast    MEDICATIONS  (PRN):  acetaminophen     Tablet .. 650 milliGRAM(s) Oral every 6 hours PRN Temp greater or equal to 38C (100.4F), Mild Pain (1 - 3)  aluminum hydroxide/magnesium hydroxide/simethicone Suspension 30 milliLiter(s) Oral every 4 hours PRN Dyspepsia  melatonin 3 milliGRAM(s) Oral at bedtime PRN Insomnia  ondansetron Injectable 4 milliGRAM(s) IV Push every 8 hours PRN Nausea and/or Vomiting         Vitals log        ICU Vital Signs Last 24 Hrs  T(C): 37.7 (17 Dec 2021 01:32), Max: 37.7 (17 Dec 2021 01:32)  T(F): 99.8 (17 Dec 2021 01:32), Max: 99.8 (17 Dec 2021 01:32)  HR: 87 (17 Dec 2021 05:58) (87 - 108)  BP: 128/67 (17 Dec 2021 05:58) (128/67 - 155/69)  BP(mean): --  ABP: --  ABP(mean): --  RR: 24 (17 Dec 2021 05:58) (22 - 24)  SpO2: 100% (17 Dec 2021 05:58) (97% - 100%)     FAMILY HISTORY:  No pertinent family history in first degree relatives. No pertinent family history of: daughter reports no known significant pmhx.     Social History:  Social History (marital status, living situation, occupation, tobacco use, alcohol and drug use, and sexual history): + former smoker (quit about 30 years ago, was about 1ppd for about 30 yrs)  + does drink about 2-3 bottles of [employer] per night (but as per daughter, has no issues with stopping or withdrawals)  - no illegal drug use  - lives with family     Tobacco Screening:  · Core Measure Site	No  · Has the patient used tobacco in the past 30 days?	No    Risk Assessment:    Present on Admission:  Deep Venous Thrombosis	no  Pulmonary Embolus	no     Heart Failure:  Does this patient have a history of or has been diagnosed with heart failure? unknown.      Input and Output:  I&O's Detail    16 Dec 2021 07:01  -  17 Dec 2021 06:56  --------------------------------------------------------  IN:    Sodium Chloride 0.9% Bolus: 2000 mL  Total IN: 2000 mL    OUT:  Total OUT: 0 mL    Total NET: 2000 mL          Lab Data                        10.5   11.14 )-----------( 157      ( 17 Dec 2021 02:01 )             31.6     12-17    132<L>  |  94<L>  |  19  ----------------------------<  137<H>  4.9   |  26  |  0.80    Ca    8.2<L>      17 Dec 2021 02:01  Phos  3.3     12-17  Mg     1.4     12-17    TPro  6.5  /  Alb  3.1<L>  /  TBili  0.5  /  DBili  x   /  AST  23  /  ALT  26  /  AlkPhos  86  12-17    ABG - ( 17 Dec 2021 04:46 )  pH, Arterial: 7.41  pH, Blood: x     /  pCO2: 35    /  pO2: 158   / HCO3: 22    / Base Excess: -2.4  /  SaO2: 98.1                    Review of Systems	      Objective     Physical Examination        Pertinent Lab findings & Imaging      Gtz:  NO   Adequate UO     I&O's Detail    16 Dec 2021 07:01  -  17 Dec 2021 06:56  --------------------------------------------------------  IN:    Sodium Chloride 0.9% Bolus: 2000 mL  Total IN: 2000 mL    OUT:  Total OUT: 0 mL    Total NET: 2000 mL               Discussed with:     Cultures:	        Radiology    ACC: [id number] EXAM:  CT ABDOMEN AND PELVIS IC                          PROCEDURE DATE:  12/17/2021          INTERPRETATION:  CLINICAL INFORMATION: Abdominal pain and fever    COMPARISON: None.    CONTRAST/COMPLICATIONS:  IV Contrast: Omnipaque 300  90 cc administered   10 cc discarded  Oral Contrast: NONE  Complications: None reported at time of study completion    PROCEDURE:  CT of the Abdomen and Pelvis was performed.  Sagittal and coronal reformats were performed.    FINDINGS:  LOWER CHEST: Mild bibasilar atelectasis.    LIVER: Within normal limits.  BILE DUCTS: Normal caliber.  GALLBLADDER: Cholecystectomy.  SPLEEN: Within normal limits.  PANCREAS: Within normal limits.  ADRENALS: Within normal limits.  KIDNEYS/URETERS: 2.7 cm indeterminant right adrenal nodule. Recommend   dedicated MRI for further characterization.    BLADDER: Within normal limits.  REPRODUCTIVE ORGANS: Prostate within normal limits.    BOWEL: No bowel obstruction. Appendix is normal. Extensive colonic   diverticulosis without diverticulitis.  PERITONEUM: No ascites.  VESSELS: Atherosclerotic changes.  RETROPERITONEUM/LYMPH NODES: No lymphadenopathy.  ABDOMINAL WALL: Within normal limits.  BONES: Degenerative changes. Left hip prosthesis causing significant   streak artifact limiting evaluation of the adjacent structures    IMPRESSION:    2.7 cm indeterminant right adrenal nodule. Recommend dedicated MRI for   further characterization.    Extensive colonic diverticulosis without diverticulitis.        --- End of Report ---            ISAIAS BIANCHI MD; Attending Radiologist  This document has been electronically signed. Dec 17 2021  4:25AM        ACC: [id number] EXAM:  XR CHEST PORTABLE IMMED 1V                          PROCEDURE DATE:  12/17/2021          INTERPRETATION:  Clinical history: 89-year-old male, sepsis.    Portable view of the chest is correlated with a concurrent abdominal CT   and demonstrates a normal cardiac silhouette and normal pulmonary   vasculature with no consolidation, effusion, pneumothorax or acute   osseous finding.    Mild bibasilar platelike atelectasis, better characterized on concurrent   CT.    IMPRESSION:  No consolidation or effusion    --- End of Report ---            PRISCILLA JAVIER DO; Attending Radiologist  This document has been electronically signed. Dec 17 2021  6:34AM                     Date/Time Patient Seen:  		  Referring MD:   Data Reviewed	       Patient is a 89y old  Male who presents with a chief complaint of hypoxia (17 Dec 2021 05:53)      Subjective/HPI  vs noted  labs reviewed  imaging reviewed  on BIPAP  alert  verbal  H and P reviewed  ER provider note reviewed     History and Physical:   Source of Information	Chart(s), Child  Outpatient Providers	Liu ABAD     Language:  · Patient/Family of Limited English Proficiency	No       Patient Identity:  · Birth Sex	Male     History of Present Illness:  Reason for Admission: hypoxia  History of Present Illness:   ***Patient with some baseline confusion as per daughter.  Patient also on BiPAP.  Therefore information is limited and collateral obtained from chart and daughter.***    89M with HTN, BLE edema, HLD, hypothyroidism, anxiety, BPH who presents for SOB.  Per daughter, patient has been coughing for the past few days.  No reported fevers.  Still had a good appetite and interacting as usual.  Then tonight patient was noted to be SOB with some audible rales.  Family got concerned and called EMS.  In the ED, patient's vitals were /74    RR 24, 97% on 3L with T 99.8.  Labs remarkable for leukocytosis of 11.1 and .  ABG showed 7.41/35/158.  Also was found to have coronavirus (but not COVID).  Patient was noted to be tachypneic with upper respiratory rales and wheezing.  ED tried nebs and suctioning but patient's respiratory status did not improve.  Therefore he was placed on BiPAP with symptomatic improvement.  ED noted that the patient was tender diffusely in his abdomen and a CT A/P was done and was unremarkable.  Currently patient says he feels fine with no breathing difficulties or pain anywhere.     PAST MEDICAL & SURGICAL HISTORY:  Hypertension    Hyperlipidemia    Hypothyroidism    Anxiety    History of BPH    GERD (gastroesophageal reflux disease)    Bilateral leg edema  was on furosemide but no longer taking          Medication list         MEDICATIONS  (STANDING):  atorvastatin 40 milliGRAM(s) Oral at bedtime  enoxaparin Injectable 40 milliGRAM(s) SubCutaneous daily  finasteride 5 milliGRAM(s) Oral daily  levothyroxine 25 MICROGram(s) Oral daily  lisinopril 20 milliGRAM(s) Oral daily  metoprolol succinate ER 25 milliGRAM(s) Oral daily  pantoprazole    Tablet 40 milliGRAM(s) Oral before breakfast    MEDICATIONS  (PRN):  acetaminophen     Tablet .. 650 milliGRAM(s) Oral every 6 hours PRN Temp greater or equal to 38C (100.4F), Mild Pain (1 - 3)  aluminum hydroxide/magnesium hydroxide/simethicone Suspension 30 milliLiter(s) Oral every 4 hours PRN Dyspepsia  melatonin 3 milliGRAM(s) Oral at bedtime PRN Insomnia  ondansetron Injectable 4 milliGRAM(s) IV Push every 8 hours PRN Nausea and/or Vomiting         Vitals log        ICU Vital Signs Last 24 Hrs  T(C): 37.7 (17 Dec 2021 01:32), Max: 37.7 (17 Dec 2021 01:32)  T(F): 99.8 (17 Dec 2021 01:32), Max: 99.8 (17 Dec 2021 01:32)  HR: 87 (17 Dec 2021 05:58) (87 - 108)  BP: 128/67 (17 Dec 2021 05:58) (128/67 - 155/69)  BP(mean): --  ABP: --  ABP(mean): --  RR: 24 (17 Dec 2021 05:58) (22 - 24)  SpO2: 100% (17 Dec 2021 05:58) (97% - 100%)     FAMILY HISTORY:  No pertinent family history in first degree relatives. No pertinent family history of: daughter reports no known significant pmhx.     Social History:  Social History (marital status, living situation, occupation, tobacco use, alcohol and drug use, and sexual history): + former smoker (quit about 30 years ago, was about 1ppd for about 30 yrs)  + does drink about 2-3 bottles of [employer] per night (but as per daughter, has no issues with stopping or withdrawals)  - no illegal drug use  - lives with family     Tobacco Screening:  · Core Measure Site	No  · Has the patient used tobacco in the past 30 days?	No    Risk Assessment:    Present on Admission:  Deep Venous Thrombosis	no  Pulmonary Embolus	no     Heart Failure:  Does this patient have a history of or has been diagnosed with heart failure? unknown.      Input and Output:  I&O's Detail    16 Dec 2021 07:01  -  17 Dec 2021 06:56  --------------------------------------------------------  IN:    Sodium Chloride 0.9% Bolus: 2000 mL  Total IN: 2000 mL    OUT:  Total OUT: 0 mL    Total NET: 2000 mL          Lab Data                        10.5   11.14 )-----------( 157      ( 17 Dec 2021 02:01 )             31.6     12-17    132<L>  |  94<L>  |  19  ----------------------------<  137<H>  4.9   |  26  |  0.80    Ca    8.2<L>      17 Dec 2021 02:01  Phos  3.3     12-17  Mg     1.4     12-17    TPro  6.5  /  Alb  3.1<L>  /  TBili  0.5  /  DBili  x   /  AST  23  /  ALT  26  /  AlkPhos  86  12-17    ABG - ( 17 Dec 2021 04:46 )  pH, Arterial: 7.41  pH, Blood: x     /  pCO2: 35    /  pO2: 158   / HCO3: 22    / Base Excess: -2.4  /  SaO2: 98.1                    Review of Systems	  resp distress      Objective     Physical Examination  alert  verbal  heart s1s2  lung dec BS  abd soft  head nc  poor dentition        Pertinent Lab findings & Imaging      Ulisses:  NO   Adequate UO     I&O's Detail    16 Dec 2021 07:01  -  17 Dec 2021 06:56  --------------------------------------------------------  IN:    Sodium Chloride 0.9% Bolus: 2000 mL  Total IN: 2000 mL    OUT:  Total OUT: 0 mL    Total NET: 2000 mL               Discussed with:     Cultures:	        Radiology    ACC: [id number] EXAM:  CT ABDOMEN AND PELVIS IC                          PROCEDURE DATE:  12/17/2021          INTERPRETATION:  CLINICAL INFORMATION: Abdominal pain and fever    COMPARISON: None.    CONTRAST/COMPLICATIONS:  IV Contrast: Omnipaque 300  90 cc administered   10 cc discarded  Oral Contrast: NONE  Complications: None reported at time of study completion    PROCEDURE:  CT of the Abdomen and Pelvis was performed.  Sagittal and coronal reformats were performed.    FINDINGS:  LOWER CHEST: Mild bibasilar atelectasis.    LIVER: Within normal limits.  BILE DUCTS: Normal caliber.  GALLBLADDER: Cholecystectomy.  SPLEEN: Within normal limits.  PANCREAS: Within normal limits.  ADRENALS: Within normal limits.  KIDNEYS/URETERS: 2.7 cm indeterminant right adrenal nodule. Recommend   dedicated MRI for further characterization.    BLADDER: Within normal limits.  REPRODUCTIVE ORGANS: Prostate within normal limits.    BOWEL: No bowel obstruction. Appendix is normal. Extensive colonic   diverticulosis without diverticulitis.  PERITONEUM: No ascites.  VESSELS: Atherosclerotic changes.  RETROPERITONEUM/LYMPH NODES: No lymphadenopathy.  ABDOMINAL WALL: Within normal limits.  BONES: Degenerative changes. Left hip prosthesis causing significant   streak artifact limiting evaluation of the adjacent structures    IMPRESSION:    2.7 cm indeterminant right adrenal nodule. Recommend dedicated MRI for   further characterization.    Extensive colonic diverticulosis without diverticulitis.        --- End of Report ---            ISAIAS BIANCHI MD; Attending Radiologist  This document has been electronically signed. Dec 17 2021  4:25AM        ACC: [id number] EXAM:  XR CHEST PORTABLE IMMED 1V                          PROCEDURE DATE:  12/17/2021          INTERPRETATION:  Clinical history: 89-year-old male, sepsis.    Portable view of the chest is correlated with a concurrent abdominal CT   and demonstrates a normal cardiac silhouette and normal pulmonary   vasculature with no consolidation, effusion, pneumothorax or acute   osseous finding.    Mild bibasilar platelike atelectasis, better characterized on concurrent   CT.    IMPRESSION:  No consolidation or effusion    --- End of Report ---            PRISCILLA JAVIER DO; Attending Radiologist  This document has been electronically signed. Dec 17 2021  6:34AM

## 2021-12-17 NOTE — GOALS OF CARE CONVERSATION - ADVANCED CARE PLANNING - CONVERSATION DETAILS
STARS Writer spoke with Mayra Guevara dtr/HCP. Reviewed patient's medical and social history as well as events leading to patient's hospitalization. Writer discussed patient's current diagnosis (PNA,HTN,BLE edema,BPPH,SOB),  medical condition and management,  .... prognosis, and life expectancy. Inquired about patient's wishes regarding extent of medical care to be provided including escalation of medical care into the ICU and use of vasopressor support. In addition, the writer inquired about thoughts regarding cardiopulmnary resuscitation, artificial nutrition and hydration including use of feeding tubes and IVF, antibiotics, and further investigative studies such as blood draws and radiology. Mayra showed good insight into medical condition. All questions answered. Writer recommended MOLST. Mayra consented to DNR/DNI  status. MOLST form filled out and placed in chart.  Psychosocial support provided. STARS Writer spoke with Mayra Guevara dtr/HCP. Reviewed patient's medical and social history as well as events leading to patient's hospitalization. Writer discussed patient's current diagnosis (PNA,HTN,BLE edema,BPPH,SOB),  medical condition and management, prognosis, and life expectancy. Inquired about patient's wishes regarding extent of medical care to be provided including escalation of medical care into the ICU and use of vasopressor support. In addition, the writer inquired about thoughts regarding cardiopulmnary resuscitation, artificial nutrition and hydration including use of feeding tubes and IVF, antibiotics, and further investigative studies such as blood draws and radiology. Mayra showed good insight into medical condition. All questions answered. Writer recommended MOLST. Mayra consented to DNR/DNI  status. MOLST form filled out and placed in chart.  Psychosocial support provided.

## 2021-12-17 NOTE — H&P ADULT - ASSESSMENT
89M with HTN, BLE edema, HLD, hypothyroidism, anxiety, BPH who presents for SOB.  Found to have +coronavirus (not COVID).  On BiPAP with symptomatic improvement.      Acute hypoxic respiratory distress - likely 2/2 secretions/mucus plugging from coronavirus infection, improving  - admitted to telemetry  - currently sat'ing well on BiPAP  - likely can come off BiPAP later today and monitor O2 sat on RA  - pulm consult (Dr. Vann) aware    Coronavirus infection  - supportive care at this time  - no abx needed     HTN/HLD  - cont with lisinopril and metoprolol with hold parameters  - cont with atorvastatin    GERD  - cont with omeprazole    Hypothyroidism  - cont with levothyroxine    Preventive measures  - lovenox for DVT ppx  - as discussed with daughter, wishes her father to be DNR but NOT DNI.

## 2021-12-17 NOTE — ED ADULT NURSE NOTE - CHEST APPEARANCE
HPI:  Skip Alvarado is in for 3 month follow-up breast check following cyst aspiration with Dr. Frantz Maradiaga. She has not noticed any changes in her breasts. She has a family history of breast cancer in her paternal grandmother, who also had ovarian cancer, and her paternal aunt. BREAST EXAM:  On examination, she has fibrocystic changes throughout both breasts, no dominant masses, no skin or nipple changes and no axillary adenopathy. I see nothing suspicious for breast cancer. ASSESSMENT:   Benign fibrocystic changes   Family history of breast and ovarian cancer             PLAN:  Genetic counseling was provided and she would like to proceed with testing. I will plan to see her back in 1 month for physical exam and bilateral mammograms and to discuss her genetic testing results. She will contact me if anything significant changes. normal

## 2021-12-17 NOTE — ED PROVIDER NOTE - CLINICAL SUMMARY MEDICAL DECISION MAKING FREE TEXT BOX
pt presenting with s/s concerning for pneumonia viral vs bacterial vs volume overload. Will obtain screening labs, EKG, chest x-ray. Pt will likely require BIPAP to aid in respirations

## 2021-12-17 NOTE — ED PROVIDER NOTE - OBJECTIVE STATEMENT
Pt is a 89 yo male who presents to the ED with a cc of difficulty breathing. PMhx of HTN, BLE edema, HLD, hypothyroidism, anxiety, BPH.  Per daughter, patient has been coughing for the past few days.  No reported fevers.  Still had a good appetite and interacting as usual.  Then tonight patient was noted to be SOB with some audible rales.  Family got concerned and called EMS. Pt noted to be confused but daughter reports that pt is at baseline mental status.

## 2021-12-17 NOTE — H&P ADULT - NSHPLABSRESULTS_GEN_ALL_CORE
LABS:                        10.5<L>  11.14<H> )-----------( 157      ( 17 Dec 2021 02:01 )             31.6<L>    132<L>  |  94<L>  |  19     ----------------------------<  137<H>    17 Dec 2021 02:01  4.9     |  26     |  0.80         Ca 8.2<L>         17 Dec 2021 02:01    Phos  3.3       17 Dec 2021 02:01    Mg 1.4<L>     17 Dec 2021 02:01    TPro  6.5    /  Alb  3.1<L>  /  TBili  0.5    /  DBili  x      /  AST  23     /  ALT  26     /  AlkPhos  86     17 Dec 2021 02:01    PT/INR - ( 17 Dec 2021 02:01 )   PT: 12.0 ;   INR: 0.99       PTT - ( 17 Dec 2021 02:01 )  PTT:29.4     Troponin trend:  Troponin I, High Sensitivity Result: 13.6 ng/L (12-17-21 @ 02:01)    Lactate, Blood: 1.6 mmol/L (12-17-21 @ 02:01)    EKG:  NSR with LAD and RBBB   Radiology:  < from: CT Abdomen and Pelvis w/ IV Cont (12.17.21 @ 04:10) >      FINDINGS:  LOWER CHEST: Mild bibasilar atelectasis.    LIVER: Within normal limits.  BILE DUCTS: Normal caliber.  GALLBLADDER: Cholecystectomy.  SPLEEN: Within normal limits.  PANCREAS: Within normal limits.  ADRENALS: Within normal limits.  KIDNEYS/URETERS: 2.7 cm indeterminant right adrenal nodule. Recommend   dedicated MRI for further characterization.    BLADDER: Within normal limits.  REPRODUCTIVE ORGANS: Prostate within normal limits.    BOWEL: No bowel obstruction. Appendix is normal. Extensive colonic   diverticulosis without diverticulitis.  PERITONEUM: No ascites.  VESSELS: Atherosclerotic changes.  RETROPERITONEUM/LYMPH NODES: No lymphadenopathy.  ABDOMINAL WALL: Within normal limits.  BONES: Degenerative changes. Left hip prosthesis causing significant   streak artifact limiting evaluation of the adjacent structures    IMPRESSION:    2.7 cm indeterminant right adrenal nodule. Recommend dedicated MRI for   further characterization.    Extensive colonic diverticulosis without diverticulitis.    < end of copied text >

## 2021-12-17 NOTE — ED PROVIDER NOTE - NSICDXPASTMEDICALHX_GEN_ALL_CORE_FT
PAST MEDICAL HISTORY:  Anxiety     Bilateral leg edema was on furosemide but no longer taking    GERD (gastroesophageal reflux disease)     History of BPH     Hyperlipidemia     Hypertension     Hypothyroidism

## 2021-12-17 NOTE — H&P ADULT - NSHPSOCIALHISTORY_GEN_ALL_CORE
+ former smoker (quit about 30 years ago, was about 1ppd for about 30 yrs)  + does drink about 2-3 bottles of Heineken per night (but as per daughter, has no issues with stopping or withdrawals)  - no illegal drug use  - lives with family

## 2021-12-18 LAB
ANION GAP SERPL CALC-SCNC: 9 MMOL/L — SIGNIFICANT CHANGE UP (ref 5–17)
APPEARANCE UR: CLEAR — SIGNIFICANT CHANGE UP
BILIRUB UR-MCNC: NEGATIVE — SIGNIFICANT CHANGE UP
BUN SERPL-MCNC: 12 MG/DL — SIGNIFICANT CHANGE UP (ref 7–23)
CALCIUM SERPL-MCNC: 8.2 MG/DL — LOW (ref 8.4–10.5)
CHLORIDE SERPL-SCNC: 101 MMOL/L — SIGNIFICANT CHANGE UP (ref 96–108)
CO2 SERPL-SCNC: 26 MMOL/L — SIGNIFICANT CHANGE UP (ref 22–31)
COLOR SPEC: YELLOW — SIGNIFICANT CHANGE UP
CREAT SERPL-MCNC: 0.66 MG/DL — SIGNIFICANT CHANGE UP (ref 0.5–1.3)
DIFF PNL FLD: ABNORMAL
GLUCOSE SERPL-MCNC: 158 MG/DL — HIGH (ref 70–99)
GLUCOSE UR QL: NEGATIVE MG/DL — SIGNIFICANT CHANGE UP
HCT VFR BLD CALC: 30.4 % — LOW (ref 39–50)
HGB BLD-MCNC: 9.9 G/DL — LOW (ref 13–17)
KETONES UR-MCNC: NEGATIVE — SIGNIFICANT CHANGE UP
LEUKOCYTE ESTERASE UR-ACNC: ABNORMAL
MCHC RBC-ENTMCNC: 32.5 PG — SIGNIFICANT CHANGE UP (ref 27–34)
MCHC RBC-ENTMCNC: 32.6 GM/DL — SIGNIFICANT CHANGE UP (ref 32–36)
MCV RBC AUTO: 99.7 FL — SIGNIFICANT CHANGE UP (ref 80–100)
NITRITE UR-MCNC: POSITIVE
NRBC # BLD: 0 /100 WBCS — SIGNIFICANT CHANGE UP (ref 0–0)
PH UR: 6 — SIGNIFICANT CHANGE UP (ref 5–8)
PLATELET # BLD AUTO: 121 K/UL — LOW (ref 150–400)
POTASSIUM SERPL-MCNC: 4.5 MMOL/L — SIGNIFICANT CHANGE UP (ref 3.5–5.3)
POTASSIUM SERPL-SCNC: 4.5 MMOL/L — SIGNIFICANT CHANGE UP (ref 3.5–5.3)
PROT UR-MCNC: 15 MG/DL
RBC # BLD: 3.05 M/UL — LOW (ref 4.2–5.8)
RBC # FLD: 13.8 % — SIGNIFICANT CHANGE UP (ref 10.3–14.5)
SODIUM SERPL-SCNC: 136 MMOL/L — SIGNIFICANT CHANGE UP (ref 135–145)
SP GR SPEC: 1.02 — SIGNIFICANT CHANGE UP (ref 1.01–1.02)
UROBILINOGEN FLD QL: NEGATIVE MG/DL — SIGNIFICANT CHANGE UP
WBC # BLD: 7.03 K/UL — SIGNIFICANT CHANGE UP (ref 3.8–10.5)
WBC # FLD AUTO: 7.03 K/UL — SIGNIFICANT CHANGE UP (ref 3.8–10.5)

## 2021-12-18 PROCEDURE — 99233 SBSQ HOSP IP/OBS HIGH 50: CPT

## 2021-12-18 RX ORDER — THIAMINE MONONITRATE (VIT B1) 100 MG
100 TABLET ORAL DAILY
Refills: 0 | Status: DISCONTINUED | OUTPATIENT
Start: 2021-12-18 | End: 2021-12-20

## 2021-12-18 RX ORDER — FOLIC ACID 0.8 MG
1 TABLET ORAL DAILY
Refills: 0 | Status: DISCONTINUED | OUTPATIENT
Start: 2021-12-18 | End: 2021-12-20

## 2021-12-18 RX ADMIN — Medication 40 MILLIGRAM(S): at 05:24

## 2021-12-18 RX ADMIN — ALBUTEROL 2.5 MILLIGRAM(S): 90 AEROSOL, METERED ORAL at 00:33

## 2021-12-18 RX ADMIN — Medication 25 MICROGRAM(S): at 05:36

## 2021-12-18 RX ADMIN — Medication 25 MILLIGRAM(S): at 05:36

## 2021-12-18 RX ADMIN — PANTOPRAZOLE SODIUM 40 MILLIGRAM(S): 20 TABLET, DELAYED RELEASE ORAL at 11:25

## 2021-12-18 RX ADMIN — LISINOPRIL 20 MILLIGRAM(S): 2.5 TABLET ORAL at 05:36

## 2021-12-18 RX ADMIN — Medication 0.5 MILLIGRAM(S): at 18:21

## 2021-12-18 RX ADMIN — ALBUTEROL 2.5 MILLIGRAM(S): 90 AEROSOL, METERED ORAL at 20:27

## 2021-12-18 RX ADMIN — Medication 40 MILLIGRAM(S): at 22:18

## 2021-12-18 RX ADMIN — Medication 40 MILLIGRAM(S): at 14:28

## 2021-12-18 RX ADMIN — Medication 1 MILLIGRAM(S): at 01:16

## 2021-12-18 RX ADMIN — ALBUTEROL 2.5 MILLIGRAM(S): 90 AEROSOL, METERED ORAL at 13:49

## 2021-12-18 RX ADMIN — DEXTROSE MONOHYDRATE, SODIUM CHLORIDE, AND POTASSIUM CHLORIDE 40 MILLILITER(S): 50; .745; 4.5 INJECTION, SOLUTION INTRAVENOUS at 19:13

## 2021-12-18 RX ADMIN — ENOXAPARIN SODIUM 40 MILLIGRAM(S): 100 INJECTION SUBCUTANEOUS at 11:25

## 2021-12-18 RX ADMIN — ALBUTEROL 2.5 MILLIGRAM(S): 90 AEROSOL, METERED ORAL at 08:01

## 2021-12-18 RX ADMIN — DEXTROSE MONOHYDRATE, SODIUM CHLORIDE, AND POTASSIUM CHLORIDE 40 MILLILITER(S): 50; .745; 4.5 INJECTION, SOLUTION INTRAVENOUS at 05:31

## 2021-12-18 RX ADMIN — Medication 0.5 MILLIGRAM(S): at 11:25

## 2021-12-18 RX ADMIN — Medication 100 MILLIGRAM(S): at 11:25

## 2021-12-18 RX ADMIN — Medication 0.5 MILLIGRAM(S): at 03:00

## 2021-12-18 NOTE — PROGRESS NOTE ADULT - TIME BILLING
reviewed w/ pulm. reviewed w/ family. reviewed w/ nursing staff, appropriate for tele for now. added ativan prn for agitation. otherwise plan as above.

## 2021-12-18 NOTE — PATIENT PROFILE ADULT - FALL HARM RISK - HARM RISK INTERVENTIONS

## 2021-12-18 NOTE — PROGRESS NOTE ADULT - ASSESSMENT
89M with HTN, BLE edema, HLD, hypothyroidism, anxiety, BPH who presents for SOB.  Found to have + seasonal coronavirus (not COVID).  On BiPAP with symptomatic improvement.      Acute hypoxic respiratory failure due to viral pneumonia   - likely 2/2 secretions/mucus plugging from coronavirus infection   - maintaining O2 sats on bipap at 100% but when attempts at weaning were made, O2sats dropped dramatically  - pulm consult (Dr. Vann) - agree w/ rec for downgrade to tele w/ continuous pulse ox monitoring  - wheezing - start trial of albuterol and steroids, if no improvement consider repeat xray in 24-48 hours.     Coronavirus infection  - supportive care at this time  - no signs of bacterial infection requiring abx.  monitor cultures.  - leukocytosis was mild and transient and has now resolved    Agitation/encephalopathy  - pt has h/o daily etoh use, may be 2/2 etoh withdrawal. add ciwa. check ammonia level in AM with a tsh, b12  - ativan 0.5mg IVP prn agitation/anxiety ordered q6h  - serial mental status assessments    EtOH abuse  - pt drinks alcohol q daily  - monitor ciwa scores  - discussed w/ nursing  - will treat for suspected thiamine deficiency    HTN/HLD  - cont with lisinopril and metoprolol with hold parameters if able to take PO  - cont with atorvastatin  - monitor urine output on meds and monitor hemodynamics closely    macrocytic anemia  - suspect 2/2 etoh use  - supplement thiamine, folate  - check iron studies  - no clinical evidence of blood loss  - serial labs    GERD  - IV protonix for now    Hypothyroidism  - cont with levothyroxine    Preventive measures  - lovenox for DVT ppx    Daughter consented to DNR/DNI - MOLST form updated  prognosis guarded.

## 2021-12-18 NOTE — PROGRESS NOTE ADULT - ASSESSMENT
89M with HTN, BLE edema, HLD, hypothyroidism, anxiety, BPH who presents for SOB.  Found to have +coronavirus    no evidence of decompensated COPD -   remains on BIPAP   wean as tolerated  GOC documented - DNR DNI  VS noted  on Solumedrol and NEBS - role to be determined      on bipap    alert - verbal -   cardio eval   TTE - reviewed  URI - sx management -   CT abd - atelectasis - right adrenal nodule - diverticular disease  monitor VS and HD and Sat  SLP eval   ABG noted  GOC discussion documented  unclear Etiol of Resp Distress at present -

## 2021-12-19 LAB
ALBUMIN SERPL ELPH-MCNC: 2.5 G/DL — LOW (ref 3.3–5)
ALP SERPL-CCNC: 70 U/L — SIGNIFICANT CHANGE UP (ref 30–120)
ALT FLD-CCNC: 29 U/L DA — SIGNIFICANT CHANGE UP (ref 10–60)
AMMONIA BLD-MCNC: 18 UMOL/L — SIGNIFICANT CHANGE UP (ref 11–32)
ANION GAP SERPL CALC-SCNC: 10 MMOL/L — SIGNIFICANT CHANGE UP (ref 5–17)
AST SERPL-CCNC: 52 U/L — HIGH (ref 10–40)
BASOPHILS # BLD AUTO: 0.02 K/UL — SIGNIFICANT CHANGE UP (ref 0–0.2)
BASOPHILS NFR BLD AUTO: 0.1 % — SIGNIFICANT CHANGE UP (ref 0–2)
BILIRUB SERPL-MCNC: 0.6 MG/DL — SIGNIFICANT CHANGE UP (ref 0.2–1.2)
BUN SERPL-MCNC: 17 MG/DL — SIGNIFICANT CHANGE UP (ref 7–23)
CALCIUM SERPL-MCNC: 8.4 MG/DL — SIGNIFICANT CHANGE UP (ref 8.4–10.5)
CHLORIDE SERPL-SCNC: 102 MMOL/L — SIGNIFICANT CHANGE UP (ref 96–108)
CO2 SERPL-SCNC: 25 MMOL/L — SIGNIFICANT CHANGE UP (ref 22–31)
CREAT SERPL-MCNC: 0.7 MG/DL — SIGNIFICANT CHANGE UP (ref 0.5–1.3)
EOSINOPHIL # BLD AUTO: 0.01 K/UL — SIGNIFICANT CHANGE UP (ref 0–0.5)
EOSINOPHIL NFR BLD AUTO: 0.1 % — SIGNIFICANT CHANGE UP (ref 0–6)
FERRITIN SERPL-MCNC: 485 NG/ML — HIGH (ref 30–400)
FOLATE SERPL-MCNC: >20 NG/ML — SIGNIFICANT CHANGE UP
GLUCOSE SERPL-MCNC: 154 MG/DL — HIGH (ref 70–99)
HCT VFR BLD CALC: 28.2 % — LOW (ref 39–50)
HGB BLD-MCNC: 9.4 G/DL — LOW (ref 13–17)
IMM GRANULOCYTES NFR BLD AUTO: 2.5 % — HIGH (ref 0–1.5)
IRON SATN MFR SERPL: 10 % — LOW (ref 16–55)
IRON SATN MFR SERPL: 23 UG/DL — LOW (ref 45–165)
LYMPHOCYTES # BLD AUTO: 0.5 K/UL — LOW (ref 1–3.3)
LYMPHOCYTES # BLD AUTO: 2.9 % — LOW (ref 13–44)
MAGNESIUM SERPL-MCNC: 2.3 MG/DL — SIGNIFICANT CHANGE UP (ref 1.6–2.6)
MCHC RBC-ENTMCNC: 33 PG — SIGNIFICANT CHANGE UP (ref 27–34)
MCHC RBC-ENTMCNC: 33.3 GM/DL — SIGNIFICANT CHANGE UP (ref 32–36)
MCV RBC AUTO: 98.9 FL — SIGNIFICANT CHANGE UP (ref 80–100)
MONOCYTES # BLD AUTO: 1.8 K/UL — HIGH (ref 0–0.9)
MONOCYTES NFR BLD AUTO: 10.3 % — SIGNIFICANT CHANGE UP (ref 2–14)
NEUTROPHILS # BLD AUTO: 14.68 K/UL — HIGH (ref 1.8–7.4)
NEUTROPHILS NFR BLD AUTO: 84.1 % — HIGH (ref 43–77)
NRBC # BLD: 0 /100 WBCS — SIGNIFICANT CHANGE UP (ref 0–0)
PHOSPHATE SERPL-MCNC: 3.2 MG/DL — SIGNIFICANT CHANGE UP (ref 2.5–4.5)
PLATELET # BLD AUTO: 156 K/UL — SIGNIFICANT CHANGE UP (ref 150–400)
POTASSIUM SERPL-MCNC: 5 MMOL/L — SIGNIFICANT CHANGE UP (ref 3.5–5.3)
POTASSIUM SERPL-SCNC: 5 MMOL/L — SIGNIFICANT CHANGE UP (ref 3.5–5.3)
PROT SERPL-MCNC: 6 G/DL — SIGNIFICANT CHANGE UP (ref 6–8.3)
RBC # BLD: 2.85 M/UL — LOW (ref 4.2–5.8)
RBC # FLD: 13.8 % — SIGNIFICANT CHANGE UP (ref 10.3–14.5)
SODIUM SERPL-SCNC: 137 MMOL/L — SIGNIFICANT CHANGE UP (ref 135–145)
TIBC SERPL-MCNC: 221 UG/DL — SIGNIFICANT CHANGE UP (ref 220–430)
TSH SERPL-MCNC: 1.2 UIU/ML — SIGNIFICANT CHANGE UP (ref 0.27–4.2)
UIBC SERPL-MCNC: 198 UG/DL — SIGNIFICANT CHANGE UP (ref 110–370)
VIT B12 SERPL-MCNC: 501 PG/ML — SIGNIFICANT CHANGE UP (ref 232–1245)
WBC # BLD: 17.44 K/UL — HIGH (ref 3.8–10.5)
WBC # FLD AUTO: 17.44 K/UL — HIGH (ref 3.8–10.5)

## 2021-12-19 PROCEDURE — 99233 SBSQ HOSP IP/OBS HIGH 50: CPT

## 2021-12-19 RX ORDER — CEFTRIAXONE 500 MG/1
INJECTION, POWDER, FOR SOLUTION INTRAMUSCULAR; INTRAVENOUS
Refills: 0 | Status: DISCONTINUED | OUTPATIENT
Start: 2021-12-19 | End: 2021-12-21

## 2021-12-19 RX ORDER — FUROSEMIDE 40 MG
20 TABLET ORAL ONCE
Refills: 0 | Status: COMPLETED | OUTPATIENT
Start: 2021-12-19 | End: 2021-12-19

## 2021-12-19 RX ORDER — CEFTRIAXONE 500 MG/1
1000 INJECTION, POWDER, FOR SOLUTION INTRAMUSCULAR; INTRAVENOUS EVERY 24 HOURS
Refills: 0 | Status: DISCONTINUED | OUTPATIENT
Start: 2021-12-20 | End: 2021-12-21

## 2021-12-19 RX ORDER — CEFTRIAXONE 500 MG/1
1000 INJECTION, POWDER, FOR SOLUTION INTRAMUSCULAR; INTRAVENOUS ONCE
Refills: 0 | Status: COMPLETED | OUTPATIENT
Start: 2021-12-19 | End: 2021-12-19

## 2021-12-19 RX ADMIN — Medication 0.5 MILLIGRAM(S): at 02:49

## 2021-12-19 RX ADMIN — Medication 20 MILLIGRAM(S): at 23:31

## 2021-12-19 RX ADMIN — ALBUTEROL 2.5 MILLIGRAM(S): 90 AEROSOL, METERED ORAL at 19:45

## 2021-12-19 RX ADMIN — Medication 0.5 MILLIGRAM(S): at 18:00

## 2021-12-19 RX ADMIN — ENOXAPARIN SODIUM 40 MILLIGRAM(S): 100 INJECTION SUBCUTANEOUS at 14:17

## 2021-12-19 RX ADMIN — Medication 40 MILLIGRAM(S): at 06:22

## 2021-12-19 RX ADMIN — Medication 25 MICROGRAM(S): at 06:22

## 2021-12-19 RX ADMIN — Medication 0.5 MILLIGRAM(S): at 18:44

## 2021-12-19 RX ADMIN — ALBUTEROL 2.5 MILLIGRAM(S): 90 AEROSOL, METERED ORAL at 01:28

## 2021-12-19 RX ADMIN — Medication 0.5 MILLIGRAM(S): at 11:05

## 2021-12-19 RX ADMIN — Medication 0.5 MILLIGRAM(S): at 23:32

## 2021-12-19 RX ADMIN — ALBUTEROL 2.5 MILLIGRAM(S): 90 AEROSOL, METERED ORAL at 13:34

## 2021-12-19 RX ADMIN — LISINOPRIL 20 MILLIGRAM(S): 2.5 TABLET ORAL at 06:21

## 2021-12-19 RX ADMIN — PANTOPRAZOLE SODIUM 40 MILLIGRAM(S): 20 TABLET, DELAYED RELEASE ORAL at 12:14

## 2021-12-19 RX ADMIN — CEFTRIAXONE 100 MILLIGRAM(S): 500 INJECTION, POWDER, FOR SOLUTION INTRAMUSCULAR; INTRAVENOUS at 16:22

## 2021-12-19 RX ADMIN — Medication 25 MILLIGRAM(S): at 06:21

## 2021-12-19 RX ADMIN — Medication 0.5 MILLIGRAM(S): at 07:20

## 2021-12-19 RX ADMIN — ALBUTEROL 2.5 MILLIGRAM(S): 90 AEROSOL, METERED ORAL at 08:19

## 2021-12-19 RX ADMIN — Medication 100 MILLIGRAM(S): at 12:15

## 2021-12-19 NOTE — CHART NOTE - NSCHARTNOTEFT_GEN_A_CORE
Called by RN for a bleeding left leg laceration, seen & examined at the bedside, about 7 cm skin laceration, with bleeding, didn't stop with applying firm pressure, reported to have a soaked dressing that was changed twice, patient is confused, was reported to injure himself yesterday. Vitals are stable, applied a sterile dressing with pressure, stat surgery consult with Dr. Cox was called.

## 2021-12-19 NOTE — PROGRESS NOTE ADULT - ASSESSMENT
89M HTN, HLD, BPH, Hypothyroidism and Anxiety admitted for Acute Hypoxic Respiratory Failure.     Acute Hypoxic Respiratory Failure - Resolved and now on Room Air.  Etiology was Season Coronavirus (Not COVID). Continue Albuterol PRN. Leukocytosis from steroids that is being tapered by Pulmonary.     Acute Metabolic Encephalopathy - Causing agitation and anxiety. History of EtOH documented and on CIWA. UA also positive so will start IV Ceftriaxone and follow cultures.     UTI - Ceftriaxone. Follow cultures.     EtOH Dependance - Need to reach out to family for more history on this.  MVI + Folic Acid + Thiamine     Superficial Laceration - Compression bandage.  Surgery consulted.     HTN/HLD - Metoprolol +  Lisinopril + Statin     Hypothyroidism - Synthroid     BPH - Finasteride     Diet - Regular    DVT Prophylaxis - Lovenox discontinued due to bleeding from superfical laceration. Will resume once resolved.     Disposition - Discharge planning pending hospital course

## 2021-12-19 NOTE — PROGRESS NOTE ADULT - ASSESSMENT
89M with HTN, BLE edema, HLD, hypothyroidism, anxiety, BPH who presents for SOB.  Found to have +coronavirus    ? DONATO - etoh use disorder - ?  Surgery eval noted - skin tear  no evidence of decompensated COPD -   bipap use prn - on nc o2 support -  - wean as tolerated  GOC documented - DNR DNI  VS noted  on Solumedrol and NEBS - role to be determined - will taper steroids this am -         alert - verbal -   cardio eval   TTE - reviewed  URI - sx management -   CT abd - atelectasis - right adrenal nodule - diverticular disease  monitor VS and HD and Sat  SLP eval   ABG noted  GOC discussion documented  unclear Etiol of Resp Distress at present -

## 2021-12-19 NOTE — CONSULT NOTE ADULT - SUBJECTIVE AND OBJECTIVE BOX
***Patient with some baseline confusion as per daughter.  P Therefore information is limited and collateral obtained from chart and daughter.***    89M with HTN, BLE edema, HLD, hypothyroidism, anxiety, BPH who presents for SOB.  Per daughter, patient has been coughing for the past few days.  No reported fevers.  Still had a good appetite and interacting as usual.  Then tonight patient was noted to be SOB with some audible rales.  Family got concerned and called EMS.  In the ED, patient's vitals were /74    RR 24, 97% on 3L with T 99.8.  Labs remarkable for leukocytosis of 11.1 and .  ABG showed 7.41/35/158.  Also was found to have coronavirus (but not COVID).  Patient was noted to be tachypneic with upper respiratory rales and wheezing.  ED tried nebs and suctioning but patient's respiratory status did not improve.  Therefore he was placed on BiPAP with symptomatic improvement.  ED noted that the patient was tender diffusely in his abdomen and a CT A/P was done and was unremarkable.  Currently patient says he feels fine with no breathing difficulties or pain anywhere.         ROS_unable to assess    PAST MEDICAL & SURGICAL HISTORY:  Hypertension    Hyperlipidemia    Hypothyroidism    Anxiety    History of BPH    GERD (gastroesophageal reflux disease)    Bilateral leg edema  was on furosemide but no longer taking      MEDICATIONS  (STANDING):  ALBUTerol    0.083% 2.5 milliGRAM(s) Nebulizer every 6 hours  atorvastatin 40 milliGRAM(s) Oral at bedtime  dextrose 5% + sodium chloride 0.9% with potassium chloride 20 mEq/L 1000 milliLiter(s) (40 mL/Hr) IV Continuous <Continuous>  enoxaparin Injectable 40 milliGRAM(s) SubCutaneous daily  finasteride 5 milliGRAM(s) Oral daily  folic acid 1 milliGRAM(s) Oral daily  levothyroxine 25 MICROGram(s) Oral daily  lisinopril 20 milliGRAM(s) Oral daily  methylPREDNISolone sodium succinate Injectable 40 milliGRAM(s) IV Push every 8 hours  metoprolol succinate ER 25 milliGRAM(s) Oral daily  pantoprazole  Injectable 40 milliGRAM(s) IV Push daily  thiamine Injectable 100 milliGRAM(s) IV Push daily    MEDICATIONS  (PRN):  acetaminophen     Tablet .. 650 milliGRAM(s) Oral every 6 hours PRN Temp greater or equal to 38C (100.4F), Mild Pain (1 - 3)  aluminum hydroxide/magnesium hydroxide/simethicone Suspension 30 milliLiter(s) Oral every 4 hours PRN Dyspepsia  LORazepam   Injectable 0.5 milliGRAM(s) IV Push every 6 hours PRN agitation or anxiety  melatonin 3 milliGRAM(s) Oral at bedtime PRN Insomnia  ondansetron Injectable 4 milliGRAM(s) IV Push every 8 hours PRN Nausea and/or Vomiting    Allergies    No Known Allergies    Intolerances        Social History:  + former smoker (quit about 30 years ago, was about 1ppd for about 30 yrs)  + does drink about 2-3 bottles of Heineken per night (but as per daughter, has no issues with stopping or withdrawals)  - no illegal drug use  .  VITAL SIGNS:  T(C): 36.7 (12-18-21 @ 18:30), Max: 37.2 (12-18-21 @ 17:12)  T(F): 98 (12-18-21 @ 18:30), Max: 98.9 (12-18-21 @ 17:12)  HR: 96 (12-19-21 @ 01:29) (71 - 104)  BP: 132/68 (12-18-21 @ 18:30) (122/75 - 150/83)  BP(mean): --  RR: 18 (12-18-21 @ 18:30) (18 - 20)  SpO2: 98% (12-19-21 @ 01:29) (98% - 100%)  Wt(kg): --    PHYSICAL EXAM:    Constitutional:  NAD, resting comfortably in bed  Head: NC/AT  Eyes: PERRL b/l  ENT: MMM  Neck: supple; no JVD or thyromegaly  Respiratory: CTA B/L   Cardiac: +S1/S2; RRR   Gastrointestinal: soft, NT/ND; no rebound or guarding; + BS  Genitourinary: normal external genitalia  Back: no CVA B/L  Extremities: L leg skin tear, with slight bleeding      .  VITAL SIGNS:  T(C): 36.7 (12-18-21 @ 18:30), Max: 37.2 (12-18-21 @ 17:12)  T(F): 98 (12-18-21 @ 18:30), Max: 98.9 (12-18-21 @ 17:12)  HR: 96 (12-19-21 @ 01:29) (71 - 104)  BP: 132/68 (12-18-21 @ 18:30) (122/75 - 150/83)  BP(mean): --  RR: 18 (12-18-21 @ 18:30) (18 - 20)  SpO2: 98% (12-19-21 @ 01:29) (98% - 100%)  Wt(kg): --    PHYSICAL EXAM:

## 2021-12-20 LAB
ANION GAP SERPL CALC-SCNC: 8 MMOL/L — SIGNIFICANT CHANGE UP (ref 5–17)
BUN SERPL-MCNC: 18 MG/DL — SIGNIFICANT CHANGE UP (ref 7–23)
CALCIUM SERPL-MCNC: 8.7 MG/DL — SIGNIFICANT CHANGE UP (ref 8.4–10.5)
CHLORIDE SERPL-SCNC: 102 MMOL/L — SIGNIFICANT CHANGE UP (ref 96–108)
CO2 SERPL-SCNC: 29 MMOL/L — SIGNIFICANT CHANGE UP (ref 22–31)
CREAT SERPL-MCNC: 0.76 MG/DL — SIGNIFICANT CHANGE UP (ref 0.5–1.3)
GLUCOSE SERPL-MCNC: 128 MG/DL — HIGH (ref 70–99)
HCT VFR BLD CALC: 28 % — LOW (ref 39–50)
HGB BLD-MCNC: 9.2 G/DL — LOW (ref 13–17)
MCHC RBC-ENTMCNC: 32.9 GM/DL — SIGNIFICANT CHANGE UP (ref 32–36)
MCHC RBC-ENTMCNC: 32.9 PG — SIGNIFICANT CHANGE UP (ref 27–34)
MCV RBC AUTO: 100 FL — SIGNIFICANT CHANGE UP (ref 80–100)
NRBC # BLD: 0 /100 WBCS — SIGNIFICANT CHANGE UP (ref 0–0)
PLATELET # BLD AUTO: 164 K/UL — SIGNIFICANT CHANGE UP (ref 150–400)
POTASSIUM SERPL-MCNC: 3.5 MMOL/L — SIGNIFICANT CHANGE UP (ref 3.5–5.3)
POTASSIUM SERPL-SCNC: 3.5 MMOL/L — SIGNIFICANT CHANGE UP (ref 3.5–5.3)
RBC # BLD: 2.8 M/UL — LOW (ref 4.2–5.8)
RBC # FLD: 13.7 % — SIGNIFICANT CHANGE UP (ref 10.3–14.5)
SODIUM SERPL-SCNC: 139 MMOL/L — SIGNIFICANT CHANGE UP (ref 135–145)
WBC # BLD: 8.91 K/UL — SIGNIFICANT CHANGE UP (ref 3.8–10.5)
WBC # FLD AUTO: 8.91 K/UL — SIGNIFICANT CHANGE UP (ref 3.8–10.5)

## 2021-12-20 PROCEDURE — 99233 SBSQ HOSP IP/OBS HIGH 50: CPT

## 2021-12-20 RX ADMIN — PANTOPRAZOLE SODIUM 40 MILLIGRAM(S): 20 TABLET, DELAYED RELEASE ORAL at 13:49

## 2021-12-20 RX ADMIN — ALBUTEROL 2.5 MILLIGRAM(S): 90 AEROSOL, METERED ORAL at 07:13

## 2021-12-20 RX ADMIN — LISINOPRIL 20 MILLIGRAM(S): 2.5 TABLET ORAL at 05:35

## 2021-12-20 RX ADMIN — Medication 25 MILLIGRAM(S): at 05:34

## 2021-12-20 RX ADMIN — ALBUTEROL 2.5 MILLIGRAM(S): 90 AEROSOL, METERED ORAL at 12:59

## 2021-12-20 RX ADMIN — CEFTRIAXONE 100 MILLIGRAM(S): 500 INJECTION, POWDER, FOR SOLUTION INTRAMUSCULAR; INTRAVENOUS at 17:31

## 2021-12-20 RX ADMIN — Medication 40 MILLIGRAM(S): at 05:35

## 2021-12-20 RX ADMIN — Medication 100 MILLIGRAM(S): at 13:48

## 2021-12-20 RX ADMIN — Medication 25 MICROGRAM(S): at 05:34

## 2021-12-20 RX ADMIN — ALBUTEROL 2.5 MILLIGRAM(S): 90 AEROSOL, METERED ORAL at 02:29

## 2021-12-20 RX ADMIN — Medication 0.5 MILLIGRAM(S): at 08:15

## 2021-12-20 NOTE — CONSULT NOTE ADULT - ASSESSMENT
The patient is a 89 year old male with a history of HTN, HL, BPH, hypothyroidism, dementia who presents with shortness of breath in the setting of viral infection.    Plan:  - ECG with no evidence of ischemia or infarction  - Cardiac enzymes negative  - BNP mildly elevated at 3863  - CXR on admission with clear lungs  - Echo with normal LV systolic function, no significant valve issues  - Noted to be coronavirus (non-COVID) positive  - No evidence of decompensated heart failure on exam  - No indication for diuretics at the current time - additionally since patient is NPO he may need gentle hydration  - Overall prognosis is very poor - comfort care would be most appropriate if clinical status does not change

## 2021-12-20 NOTE — PROGRESS NOTE ADULT - ASSESSMENT
89M with HTN, BLE edema, HLD, hypothyroidism, anxiety, BPH who presents for SOB.  Found to have +coronavirus    overnight wheezing reported -     ? DONATO - etoh use disorder - ?  Surgery eval noted - skin tear  no evidence of decompensated COPD -   s/p BIPAP - on o2 support - NC  GOC documented - DNR DNI  VS noted  on Solumedrol and NEBS - although there is no hx of COPD - or Smoking      cardio eval   TTE - reviewed  URI - sx management - on emp ABX  CT abd - atelectasis - right adrenal nodule - diverticular disease  monitor VS and HD and Sat  SLP eval   ABG noted  GOC discussion documented

## 2021-12-20 NOTE — GOALS OF CARE CONVERSATION - ADVANCED CARE PLANNING - CONVERSATION DETAILS
Discussed patient condition with daughter as she would be the Health Care Proxy. Given that Mom also has dementia baseline and would need to be decision maker.  Prognosis is guarded and offered Palliative and Hospice services.  Patient would like to explore these options and referral made.

## 2021-12-20 NOTE — PROGRESS NOTE ADULT - ASSESSMENT
89M HTN, HLD, BPH, Hypothyroidism and Anxiety admitted for Acute Hypoxic Respiratory Failure.     Acute Hypoxic Respiratory Failure - Resolved and now on Room Air.  Etiology was Season Coronavirus (Not COVID). Continue Albuterol PRN. Leukocytosis from steroids that is being tapered by Pulmonary.     Acute Metabolic Encephalopathy - Causing agitation and anxiety. History of EtOH documented and on CIWA. UA also positive so will start IV Ceftriaxone and follow cultures.     UTI - Ceftriaxone. Follow cultures.     EtOH Dependance - Need to reach out to family for more history on this.  MVI + Folic Acid + Thiamine     Superficial Laceration - Compression bandage.  Surgery consulted.     HTN/HLD - Metoprolol +  Lisinopril + Statin     Hypothyroidism - Synthroid     BPH - Finasteride     Diet - Regular    DVT Prophylaxis - Lovenox discontinued due to bleeding from superfical laceration. Will resume once resolved.     Disposition - Discharge planning pending hospital course          89M HTN, HLD, BPH, Hypothyroidism and Anxiety admitted for Acute Hypoxic Respiratory Failure.     Acute Hypoxic Respiratory Failure - Resolved and now on Room Air.  Etiology was Season Coronavirus (Not COVID). Continue Albuterol PRN. Leukocytosis from steroids that is being tapered by Pulmonary.     Acute Metabolic Encephalopathy - Causing agitation and anxiety. History of EtOH documented and on CIWA. UA also positive so will start IV Ceftriaxone and follow cultures.     Dementia - With Behavioral disturbances.  Will place on Seroquel at bedtime as well as Zyprexa PRN    UTI - Ceftriaxone. Follow cultures.     EtOH Dependance - Need to reach out to family for more history on this.  MVI + Folic Acid + Thiamine     Superficial Laceration - Compression bandage.  Surgery consulted.     HTN/HLD - Metoprolol +  Lisinopril + Statin     Hypothyroidism - Synthroid     BPH - Finasteride     Diet - Regular    DVT Prophylaxis - Lovenox discontinued due to bleeding from superfical laceration. Will resume once resolved.     Disposition - Spoke to Daughter regarding advanced directives.  She is health care proxy.  Has baseline dementia. Will bring in advacned directives for us to evaluate.  Patient is DNR.  Will make Palliative and Hospice referral. Patient may need additional support on discharge due to change in condition.          89M HTN, HLD, BPH, Hypothyroidism and Anxiety admitted for Acute Hypoxic Respiratory Failure.     Acute Hypoxic Respiratory Failure - Resolved and now on Room Air.  Etiology was Season Coronavirus (Not COVID). Continue Albuterol PRN. Leukocytosis from steroids that is being tapered by Pulmonary.     Acute Metabolic Encephalopathy - Causing agitation and anxiety. History of EtOH documented and on CIWA. UA also positive so will start IV Ceftriaxone and follow cultures.     Dementia - With Behavioral disturbances.  Will place on Seroquel at bedtime as well as Zyprexa PRN.  On constant observation.    UTI - Ceftriaxone. Follow cultures.     EtOH Dependance - Doubt there is a withdrawal component. Discontinue CIWA. Drinks 2 Beers per night as per daughter.  MVI + Folic Acid + Thiamine     Superficial Laceration - Compression bandage.  Surgery consulted.     HTN/HLD - Metoprolol +  Lisinopril + Statin     Hypothyroidism - Synthroid     BPH - Finasteride     Diet - Regular    DVT Prophylaxis - Lovenox discontinued due to bleeding from superfical laceration. Will resume once resolved.     Disposition - Spoke to Daughter regarding advanced directives.  She is health care proxy.  Has baseline dementia. Will bring in advacned directives for us to evaluate.  Patient is DNR.  Will make Palliative and Hospice referral. Patient may need additional support on discharge due to change in condition.

## 2021-12-20 NOTE — CONSULT NOTE ADULT - SUBJECTIVE AND OBJECTIVE BOX
History of Present Illness: The patient is a 89 year old male with a history of HTN, HL, BPH, hypothyroidism, dementia who presents with shortness of breath. The patient is unable to provide any history to me. He was noted to be positive for coronavirus.    Past Medical/Surgical History:  HTN, HL, BPH, hypothyroidism, dementia    Medications:  Home Medications:  atorvastatin 40 mg oral tablet: 1 tab(s) orally once a day (17 Dec 2021 15:46)  finasteride 5 mg oral tablet: 1 tab(s) orally once a day (17 Dec 2021 15:46)  levothyroxine 25 mcg (0.025 mg) oral capsule: 1 cap(s) orally once a day (17 Dec 2021 15:46)  lisinopril 20 mg oral tablet: 1 tab(s) orally once a day (17 Dec 2021 15:46)  metoprolol tartrate 25 mg oral tablet:  (17 Dec 2021 15:46)  omeprazole 20 mg oral delayed release capsule: 1 cap(s) orally once a day (17 Dec 2021 15:46)      Family History: Non-contributory family history of premature cardiovascular atherosclerotic disease    Social History: No tobacco, alcohol or drug use    Review of Systems:  General: No fevers, chills, weight gain  Skin: No rashes, color changes  Cardiovascular: No chest pain, orthopnea  Respiratory: No shortness of breath, cough  Gastrointestinal: No nausea, abdominal pain  Genitourinary: No incontinence, pain with urination  Musculoskeletal: No pain, swelling, decreased range of motion  Neurological: No headache, weakness  Psychiatric: No depression, anxiety  Endocrine: No weight gain, increased thirst  All other systems are comprehensively negative.    Physical Exam:  Vitals:        Vital Signs Last 24 Hrs  T(C): 36.7 (20 Dec 2021 09:35), Max: 36.7 (19 Dec 2021 13:30)  T(F): 98 (20 Dec 2021 09:35), Max: 98 (19 Dec 2021 13:30)  HR: 95 (20 Dec 2021 13:02) (84 - 104)  BP: 144/77 (20 Dec 2021 09:35) (111/64 - 144/77)  BP(mean): --  RR: 17 (20 Dec 2021 09:35) (16 - 20)  SpO2: 96% (20 Dec 2021 13:02) (94% - 100%)  General: NAD  HEENT: MMM  Neck: No JVD, no carotid bruit  Lungs: CTAB  CV: RRR, nl S1/S2, no M/R/G  Abdomen: S/NT/ND, +BS  Extremities: No LE edema, no cyanosis  Neuro: AAOx0, non-focal  Skin: No rash    Labs:                        9.2    8.91  )-----------( 164      ( 20 Dec 2021 07:40 )             28.0     12-20    139  |  102  |  18  ----------------------------<  128<H>  3.5   |  29  |  0.76    Ca    8.7      20 Dec 2021 07:40  Phos  3.2     12-19  Mg     2.3     12-19    TPro  6.0  /  Alb  2.5<L>  /  TBili  0.6  /  DBili  x   /  AST  52<H>  /  ALT  29  /  AlkPhos  70  12-19            ECG: NSR, LAD, RBBB    Telemetry: Sinus rhythm

## 2021-12-21 ENCOUNTER — TRANSCRIPTION ENCOUNTER (OUTPATIENT)
Age: 86
End: 2021-12-21

## 2021-12-21 VITALS
TEMPERATURE: 98 F | DIASTOLIC BLOOD PRESSURE: 72 MMHG | SYSTOLIC BLOOD PRESSURE: 112 MMHG | OXYGEN SATURATION: 94 % | RESPIRATION RATE: 18 BRPM | HEART RATE: 85 BPM

## 2021-12-21 LAB
ANION GAP SERPL CALC-SCNC: 9 MMOL/L — SIGNIFICANT CHANGE UP (ref 5–17)
BUN SERPL-MCNC: 26 MG/DL — HIGH (ref 7–23)
CALCIUM SERPL-MCNC: 8.5 MG/DL — SIGNIFICANT CHANGE UP (ref 8.4–10.5)
CHLORIDE SERPL-SCNC: 103 MMOL/L — SIGNIFICANT CHANGE UP (ref 96–108)
CO2 SERPL-SCNC: 28 MMOL/L — SIGNIFICANT CHANGE UP (ref 22–31)
CREAT SERPL-MCNC: 0.78 MG/DL — SIGNIFICANT CHANGE UP (ref 0.5–1.3)
GLUCOSE SERPL-MCNC: 106 MG/DL — HIGH (ref 70–99)
HCT VFR BLD CALC: 27.6 % — LOW (ref 39–50)
HGB BLD-MCNC: 9.1 G/DL — LOW (ref 13–17)
MCHC RBC-ENTMCNC: 33 GM/DL — SIGNIFICANT CHANGE UP (ref 32–36)
MCHC RBC-ENTMCNC: 33 PG — SIGNIFICANT CHANGE UP (ref 27–34)
MCV RBC AUTO: 100 FL — SIGNIFICANT CHANGE UP (ref 80–100)
NRBC # BLD: 0 /100 WBCS — SIGNIFICANT CHANGE UP (ref 0–0)
PLATELET # BLD AUTO: 189 K/UL — SIGNIFICANT CHANGE UP (ref 150–400)
POTASSIUM SERPL-MCNC: 3.1 MMOL/L — LOW (ref 3.5–5.3)
POTASSIUM SERPL-SCNC: 3.1 MMOL/L — LOW (ref 3.5–5.3)
RBC # BLD: 2.76 M/UL — LOW (ref 4.2–5.8)
RBC # FLD: 13.6 % — SIGNIFICANT CHANGE UP (ref 10.3–14.5)
SODIUM SERPL-SCNC: 140 MMOL/L — SIGNIFICANT CHANGE UP (ref 135–145)
WBC # BLD: 8.34 K/UL — SIGNIFICANT CHANGE UP (ref 3.8–10.5)
WBC # FLD AUTO: 8.34 K/UL — SIGNIFICANT CHANGE UP (ref 3.8–10.5)

## 2021-12-21 PROCEDURE — 99232 SBSQ HOSP IP/OBS MODERATE 35: CPT

## 2021-12-21 PROCEDURE — 99223 1ST HOSP IP/OBS HIGH 75: CPT

## 2021-12-21 PROCEDURE — 99239 HOSP IP/OBS DSCHRG MGMT >30: CPT

## 2021-12-21 RX ORDER — OMEPRAZOLE 10 MG/1
1 CAPSULE, DELAYED RELEASE ORAL
Qty: 0 | Refills: 0 | DISCHARGE

## 2021-12-21 RX ORDER — OLANZAPINE 15 MG/1
5 TABLET, FILM COATED ORAL EVERY 8 HOURS
Refills: 0 | Status: DISCONTINUED | OUTPATIENT
Start: 2021-12-21 | End: 2021-12-21

## 2021-12-21 RX ORDER — FINASTERIDE 5 MG/1
1 TABLET, FILM COATED ORAL
Qty: 0 | Refills: 0 | DISCHARGE

## 2021-12-21 RX ORDER — ATORVASTATIN CALCIUM 80 MG/1
1 TABLET, FILM COATED ORAL
Qty: 0 | Refills: 0 | DISCHARGE

## 2021-12-21 RX ORDER — LISINOPRIL 2.5 MG/1
1 TABLET ORAL
Qty: 0 | Refills: 0 | DISCHARGE

## 2021-12-21 RX ORDER — POTASSIUM CHLORIDE 20 MEQ
10 PACKET (EA) ORAL
Refills: 0 | Status: COMPLETED | OUTPATIENT
Start: 2021-12-21 | End: 2021-12-21

## 2021-12-21 RX ORDER — LEVOTHYROXINE SODIUM 125 MCG
1 TABLET ORAL
Qty: 0 | Refills: 0 | DISCHARGE

## 2021-12-21 RX ORDER — METOPROLOL TARTRATE 50 MG
0 TABLET ORAL
Qty: 0 | Refills: 0 | DISCHARGE

## 2021-12-21 RX ADMIN — Medication 100 MILLIEQUIVALENT(S): at 10:28

## 2021-12-21 RX ADMIN — Medication 0.5 MILLIGRAM(S): at 14:56

## 2021-12-21 RX ADMIN — PANTOPRAZOLE SODIUM 40 MILLIGRAM(S): 20 TABLET, DELAYED RELEASE ORAL at 11:18

## 2021-12-21 RX ADMIN — FINASTERIDE 5 MILLIGRAM(S): 5 TABLET, FILM COATED ORAL at 11:17

## 2021-12-21 RX ADMIN — ALBUTEROL 2.5 MILLIGRAM(S): 90 AEROSOL, METERED ORAL at 07:00

## 2021-12-21 RX ADMIN — ALBUTEROL 2.5 MILLIGRAM(S): 90 AEROSOL, METERED ORAL at 00:39

## 2021-12-21 RX ADMIN — Medication 100 MILLIEQUIVALENT(S): at 13:10

## 2021-12-21 RX ADMIN — Medication 25 MILLIGRAM(S): at 06:19

## 2021-12-21 RX ADMIN — Medication 0.5 MILLIGRAM(S): at 04:03

## 2021-12-21 RX ADMIN — OLANZAPINE 5 MILLIGRAM(S): 15 TABLET, FILM COATED ORAL at 10:32

## 2021-12-21 RX ADMIN — CEFTRIAXONE 100 MILLIGRAM(S): 500 INJECTION, POWDER, FOR SOLUTION INTRAMUSCULAR; INTRAVENOUS at 14:31

## 2021-12-21 RX ADMIN — LISINOPRIL 20 MILLIGRAM(S): 2.5 TABLET ORAL at 06:19

## 2021-12-21 RX ADMIN — Medication 25 MICROGRAM(S): at 05:37

## 2021-12-21 RX ADMIN — ALBUTEROL 2.5 MILLIGRAM(S): 90 AEROSOL, METERED ORAL at 13:37

## 2021-12-21 RX ADMIN — Medication 100 MILLIEQUIVALENT(S): at 11:18

## 2021-12-21 NOTE — DIETITIAN INITIAL EVALUATION ADULT. - PERTINENT MEDS FT
MEDICATIONS  (STANDING):  ALBUTerol    0.083% 2.5 milliGRAM(s) Nebulizer every 6 hours  atorvastatin 40 milliGRAM(s) Oral at bedtime  cefTRIAXone   IVPB      cefTRIAXone   IVPB 1000 milliGRAM(s) IV Intermittent every 24 hours  finasteride 5 milliGRAM(s) Oral daily  levothyroxine 25 MICROGram(s) Oral daily  lisinopril 20 milliGRAM(s) Oral daily  metoprolol succinate ER 25 milliGRAM(s) Oral daily  pantoprazole  Injectable 40 milliGRAM(s) IV Push daily  potassium chloride  10 mEq/100 mL IVPB 10 milliEquivalent(s) IV Intermittent every 1 hour    MEDICATIONS  (PRN):  acetaminophen     Tablet .. 650 milliGRAM(s) Oral every 6 hours PRN Temp greater or equal to 38C (100.4F), Mild Pain (1 - 3)  aluminum hydroxide/magnesium hydroxide/simethicone Suspension 30 milliLiter(s) Oral every 4 hours PRN Dyspepsia  OLANZapine Injectable 5 milliGRAM(s) IntraMuscular every 8 hours PRN Agitation  ondansetron Injectable 4 milliGRAM(s) IV Push every 8 hours PRN Nausea and/or Vomiting

## 2021-12-21 NOTE — CONSULT NOTE ADULT - SUBJECTIVE AND OBJECTIVE BOX
note in progress    Dots Note    HPI:  ***Patient with some baseline confusion as per daughter.  Patient also on BiPAP.  Therefore information is limited and collateral obtained from chart and daughter.***    89M with HTN, BLE edema, HLD, hypothyroidism, anxiety, BPH who presents for SOB.  Per daughter, patient has been coughing for the past few days.  No reported fevers.  Still had a good appetite and interacting as usual.  Then tonight patient was noted to be SOB with some audible rales.  Family got concerned and called EMS.  In the ED, patient's vitals were /74    RR 24, 97% on 3L with T 99.8.  Labs remarkable for leukocytosis of 11.1 and .  ABG showed 7.41/35/158.  Also was found to have coronavirus (but not COVID).  Patient was noted to be tachypneic with upper respiratory rales and wheezing.  ED tried nebs and suctioning but patient's respiratory status did not improve.  Therefore he was placed on BiPAP with symptomatic improvement.  ED noted that the patient was tender diffusely in his abdomen and a CT A/P was done and was unremarkable.  Currently patient says he feels fine with no breathing difficulties or pain anywhere.    (17 Dec 2021 05:53)      PAST MEDICAL & SURGICAL HISTORY:  Hypertension    Hyperlipidemia    Hypothyroidism    Anxiety    History of BPH    GERD (gastroesophageal reflux disease)    Bilateral leg edema  was on furosemide but no longer taking        REVIEW OF SYSTEMS      General:	    Skin/Breast:  	  Ophthalmologic:  	  ENMT:	    Respiratory and Thorax:  	  Cardiovascular:	    Gastrointestinal:	    Genitourinary:	    Musculoskeletal:	    Neurological:	    Psychiatric:	    Hematology/Lymphatics:	    Endocrine:	    Allergic/Immunologic:	  >>> <<<    REVIEW OF SYSTEMS  CONSTITUTIONAL: +weakness, no fevers or chills  HEENT: denies blurred visions, sore throat  SKIN: denies new lesions, rash  CARDIOVASCULAR: no palpitations  RESPIRATORY: denies shortness of breath, sputum production  GASTROINTESTINAL: denies nausea, vomiting, diarrhea, abdominal pain  all other ROS is negative unless indicated above  Unable to obtain ROS  2/2 non-verbal status  Patient is unable to provide any information/ROS  due to baseline mental status    MEDICATIONS  (STANDING):  ALBUTerol    0.083% 2.5 milliGRAM(s) Nebulizer every 6 hours  atorvastatin 40 milliGRAM(s) Oral at bedtime  cefTRIAXone   IVPB      cefTRIAXone   IVPB 1000 milliGRAM(s) IV Intermittent every 24 hours  finasteride 5 milliGRAM(s) Oral daily  levothyroxine 25 MICROGram(s) Oral daily  lisinopril 20 milliGRAM(s) Oral daily  metoprolol succinate ER 25 milliGRAM(s) Oral daily  pantoprazole  Injectable 40 milliGRAM(s) IV Push daily    MEDICATIONS  (PRN):  acetaminophen     Tablet .. 650 milliGRAM(s) Oral every 6 hours PRN Temp greater or equal to 38C (100.4F), Mild Pain (1 - 3)  aluminum hydroxide/magnesium hydroxide/simethicone Suspension 30 milliLiter(s) Oral every 4 hours PRN Dyspepsia  ondansetron Injectable 4 milliGRAM(s) IV Push every 8 hours PRN Nausea and/or Vomiting      Allergies    No Known Allergies    Intolerances        SOCIAL HISTORY:  / single/ ; (+)HHA; Denies smoking, ETOH, drugs    FAMILY HISTORY:  No pertinent family history in first degree relatives        Vital Signs Last 24 Hrs  T(C): 37.2 (21 Dec 2021 06:09), Max: 37.2 (21 Dec 2021 06:09)  T(F): 98.9 (21 Dec 2021 06:09), Max: 98.9 (21 Dec 2021 06:09)  HR: 90 (21 Dec 2021 07:05) (73 - 104)  BP: 110/64 (21 Dec 2021 06:09) (110/64 - 167/83)  BP(mean): --  RR: 18 (21 Dec 2021 06:09) (16 - 20)  SpO2: 99% (21 Dec 2021 07:05) (91% - 99%)    NAD / gaurded but stable,  A&Ox3/ Alert  cachectic/ MO/ Obese  within defined normal limits  Total Care Sport/ Versa Care P500 bed                            9.2    8.91  )-----------( 164      ( 20 Dec 2021 07:40 )             28.0     12-20    139  |  102  |  18  ----------------------------<  128<H>  3.5   |  29  |  0.76    Ca    8.7      20 Dec 2021 07:40          Respiratory: CTA    Gastrointestinal soft NT/ND (+)BS    Neurology  weakened strength & sensation grossly intact/ paraesthesia  nonverbal, Can not follow commands    Musculoskeletal/Vascular:  ROM  No edema, warm, no calf tenderness, no hair growth  DP/PT  hemosiderin staining  no deformities/ contractures  DP & PT pulses non-palpable bilaterally 2/2 +1 lower extremity pitting edema, Capillary refill 3 seconds, no hair growth, skin temp warm b/l.    Skin:  moist w/ good turgor  frail,  ecchymosis w/o hematoma  serosanguinous drainage, erythema  No odor, erythema, increased warmth, tenderness, induration, fluctuance          RADIOLOGY & ADDITIONAL STUDIES         HPI:   89M with HTN, BLE edema, HLD, hypothyroidism, anxiety, BPH admitted with SOB.  Patient is a poor historian and states he feels better. He presents with bilateral upper extremity skin tears and right lower extremity skin tear.    PAST MEDICAL & SURGICAL HISTORY:  Hypertension    Hyperlipidemia    Hypothyroidism    Anxiety    History of BPH    GERD (gastroesophageal reflux disease)    Bilateral leg edema  was on furosemide but no longer taking    REVIEW OF SYSTEMS  Patient is unable to provide any information/ROS  due to baseline mental status    MEDICATIONS  (STANDING):  ALBUTerol    0.083% 2.5 milliGRAM(s) Nebulizer every 6 hours  atorvastatin 40 milliGRAM(s) Oral at bedtime  cefTRIAXone   IVPB      cefTRIAXone   IVPB 1000 milliGRAM(s) IV Intermittent every 24 hours  finasteride 5 milliGRAM(s) Oral daily  levothyroxine 25 MICROGram(s) Oral daily  lisinopril 20 milliGRAM(s) Oral daily  metoprolol succinate ER 25 milliGRAM(s) Oral daily  pantoprazole  Injectable 40 milliGRAM(s) IV Push daily    MEDICATIONS  (PRN):  acetaminophen     Tablet .. 650 milliGRAM(s) Oral every 6 hours PRN Temp greater or equal to 38C (100.4F), Mild Pain (1 - 3)  aluminum hydroxide/magnesium hydroxide/simethicone Suspension 30 milliLiter(s) Oral every 4 hours PRN Dyspepsia  ondansetron Injectable 4 milliGRAM(s) IV Push every 8 hours PRN Nausea and/or Vomiting      Allergies    No Known Allergies    Intolerances    SOCIAL HISTORY:      FAMILY HISTORY:  No pertinent family history in first degree relatives    Vital Signs Last 24 Hrs  T(C): 37.2 (21 Dec 2021 06:09), Max: 37.2 (21 Dec 2021 06:09)  T(F): 98.9 (21 Dec 2021 06:09), Max: 98.9 (21 Dec 2021 06:09)  HR: 90 (21 Dec 2021 07:05) (73 - 104)  BP: 110/64 (21 Dec 2021 06:09) (110/64 - 167/83)  BP(mean): --  RR: 18 (21 Dec 2021 06:09) (16 - 20)  SpO2: 99% (21 Dec 2021 07:05) (91% - 99%)    NAD/ A&Ox0  Total Care Sport/ Versa Care P500 bed                            9.2    8.91  )-----------( 164      ( 20 Dec 2021 07:40 )             28.0     12-20    139  |  102  |  18  ----------------------------<  128<H>  3.5   |  29  |  0.76    Ca    8.7      20 Dec 2021 07:40      Neurology    nonverbal, Can not follow commands  no deformities/ contractures      Skin:  frail,  ecchymosis w/o hematoma  serosanguinous drainage, erythema  No odor, warmth, tenderness, induration, fluctuance

## 2021-12-21 NOTE — CONSULT NOTE ADULT - ASSESSMENT
#Advanced dementia   #Dysphagia   #Agitation  Ativan     #Goals of care/advance care planning  - Palliative performance scale score: 10% (poor)  - Prognosis: days-weeks (pt is hospice eligible)   - Code status: DNR/DNI (MOLST form in the chart)  - GOC: d/c to inpatient hospice   - HCP:   - Psychosocial support provided  - SW to start referral to hospice    Appreciate consult. Discussed with the primary team.    Isamar Yepez MD     89 yo M HTN, HLD, BPH, Hypothyroidism and Anxiety admitted for Acute Hypoxic Respiratory Failure.     Acute Hypoxic Respiratory Failure 2/2 season Coronavirus (Not COVID)  - Resolved and now on Room Air.    - Albuterol PRN.      Acute Metabolic Encephalopathy   Agitation and anxiety   - Ativan prn    Dementia - With Behavioral disturbances  - Ativan  - D/c po meds       UTI   - Ceftriaxone     EtOH Dependance - Doubt there is a withdrawal component. Discontinue CIWA. Drinks 2 Beers per night as per daughter.       Goals of care/advance care planning  - Palliative performance scale score: 10% (poor)  - Prognosis: days-weeks (pt is hospice eligible)   - Code status: DNR/DNI (MOLST form in the chart)  - GOC: d/c to inpatient hospice   - Psychosocial support provided  - SW to start referral to hospice    Appreciate consult. Discussed with the primary team.    Isamar Yepez MD

## 2021-12-21 NOTE — PROGRESS NOTE ADULT - SUBJECTIVE AND OBJECTIVE BOX
Date/Time Patient Seen:  		  Referring MD:   Data Reviewed	       Patient is a 89y old  Male who presents with a chief complaint of hypoxia (19 Dec 2021 02:39)      Subjective/HPI     PAST MEDICAL & SURGICAL HISTORY:  Hypertension    Hyperlipidemia    Hypothyroidism    Anxiety    History of BPH    GERD (gastroesophageal reflux disease)    Bilateral leg edema  was on furosemide but no longer taking          Medication list         MEDICATIONS  (STANDING):  ALBUTerol    0.083% 2.5 milliGRAM(s) Nebulizer every 6 hours  atorvastatin 40 milliGRAM(s) Oral at bedtime  dextrose 5% + sodium chloride 0.9% with potassium chloride 20 mEq/L 1000 milliLiter(s) (40 mL/Hr) IV Continuous <Continuous>  enoxaparin Injectable 40 milliGRAM(s) SubCutaneous daily  finasteride 5 milliGRAM(s) Oral daily  folic acid 1 milliGRAM(s) Oral daily  levothyroxine 25 MICROGram(s) Oral daily  lisinopril 20 milliGRAM(s) Oral daily  methylPREDNISolone sodium succinate Injectable 40 milliGRAM(s) IV Push every 8 hours  metoprolol succinate ER 25 milliGRAM(s) Oral daily  pantoprazole  Injectable 40 milliGRAM(s) IV Push daily  thiamine Injectable 100 milliGRAM(s) IV Push daily    MEDICATIONS  (PRN):  acetaminophen     Tablet .. 650 milliGRAM(s) Oral every 6 hours PRN Temp greater or equal to 38C (100.4F), Mild Pain (1 - 3)  aluminum hydroxide/magnesium hydroxide/simethicone Suspension 30 milliLiter(s) Oral every 4 hours PRN Dyspepsia  LORazepam   Injectable 0.5 milliGRAM(s) IV Push every 6 hours PRN agitation or anxiety  melatonin 3 milliGRAM(s) Oral at bedtime PRN Insomnia  ondansetron Injectable 4 milliGRAM(s) IV Push every 8 hours PRN Nausea and/or Vomiting         Vitals log        ICU Vital Signs Last 24 Hrs  T(C): 36.2 (19 Dec 2021 05:30), Max: 37.2 (18 Dec 2021 17:12)  T(F): 97.2 (19 Dec 2021 05:30), Max: 98.9 (18 Dec 2021 17:12)  HR: 87 (19 Dec 2021 05:31) (71 - 104)  BP: 122/69 (19 Dec 2021 05:30) (122/69 - 150/83)  BP(mean): --  ABP: --  ABP(mean): --  RR: 20 (19 Dec 2021 05:30) (18 - 20)  SpO2: 99% (19 Dec 2021 05:31) (93% - 100%)           Input and Output:  I&O's Detail      Lab Data                        9.9    7.03  )-----------( 121      ( 18 Dec 2021 06:57 )             30.4     12-18    136  |  101  |  12  ----------------------------<  158<H>  4.5   |  26  |  0.66    Ca    8.2<L>      18 Dec 2021 06:57  Phos  3.3     12-17  Mg     2.1     12-17    TPro  6.5  /  Alb  2.8<L>  /  TBili  0.6  /  DBili  x   /  AST  31  /  ALT  22  /  AlkPhos  80  12-17            Review of Systems	      Objective     Physical Examination    heart s1s2  lung dec BS  abd soft  poor dentition      Pertinent Lab findings & Imaging      Ulisses:  NO   Adequate UO     I&O's Detail           Discussed with:     Cultures:	        Radiology                            
Date/Time Patient Seen:  		  Referring MD:   Data Reviewed	       Patient is a 89y old  Male who presents with a chief complaint of hypoxia (19 Dec 2021 12:30)      Subjective/HPI     PAST MEDICAL & SURGICAL HISTORY:  Hypertension    Hyperlipidemia    Hypothyroidism    Anxiety    History of BPH    GERD (gastroesophageal reflux disease)    Bilateral leg edema  was on furosemide but no longer taking          Medication list         MEDICATIONS  (STANDING):  ALBUTerol    0.083% 2.5 milliGRAM(s) Nebulizer every 6 hours  atorvastatin 40 milliGRAM(s) Oral at bedtime  cefTRIAXone   IVPB 1000 milliGRAM(s) IV Intermittent every 24 hours  cefTRIAXone   IVPB      finasteride 5 milliGRAM(s) Oral daily  folic acid 1 milliGRAM(s) Oral daily  levothyroxine 25 MICROGram(s) Oral daily  lisinopril 20 milliGRAM(s) Oral daily  methylPREDNISolone sodium succinate Injectable 40 milliGRAM(s) IV Push daily  metoprolol succinate ER 25 milliGRAM(s) Oral daily  pantoprazole  Injectable 40 milliGRAM(s) IV Push daily  thiamine Injectable 100 milliGRAM(s) IV Push daily    MEDICATIONS  (PRN):  acetaminophen     Tablet .. 650 milliGRAM(s) Oral every 6 hours PRN Temp greater or equal to 38C (100.4F), Mild Pain (1 - 3)  aluminum hydroxide/magnesium hydroxide/simethicone Suspension 30 milliLiter(s) Oral every 4 hours PRN Dyspepsia  LORazepam   Injectable 0.5 milliGRAM(s) IV Push every 6 hours PRN agitation or anxiety  melatonin 3 milliGRAM(s) Oral at bedtime PRN Insomnia  ondansetron Injectable 4 milliGRAM(s) IV Push every 8 hours PRN Nausea and/or Vomiting         Vitals log        ICU Vital Signs Last 24 Hrs  T(C): 36.4 (20 Dec 2021 05:32), Max: 36.7 (19 Dec 2021 13:30)  T(F): 97.5 (20 Dec 2021 05:32), Max: 98 (19 Dec 2021 13:30)  HR: 90 (20 Dec 2021 05:32) (79 - 104)  BP: 118/62 (20 Dec 2021 05:32) (111/64 - 140/87)  BP(mean): --  ABP: --  ABP(mean): --  RR: 18 (20 Dec 2021 05:32) (16 - 20)  SpO2: 95% (20 Dec 2021 05:32) (94% - 100%)           Input and Output:  I&O's Detail      Lab Data                        9.4    17.44 )-----------( 156      ( 19 Dec 2021 07:50 )             28.2     12-19    137  |  102  |  17  ----------------------------<  154<H>  5.0   |  25  |  0.70    Ca    8.4      19 Dec 2021 07:50  Phos  3.2     12-19  Mg     2.3     12-19    TPro  6.0  /  Alb  2.5<L>  /  TBili  0.6  /  DBili  x   /  AST  52<H>  /  ALT  29  /  AlkPhos  70  12-19            Review of Systems	      Objective     Physical Examination    heart s1s2  lung dec BS  abd soft  head nc      Pertinent Lab findings & Imaging      Ulisses:  NO   Adequate UO     I&O's Detail           Discussed with:     Cultures:	        Radiology                            
Name: ANDREINA MCCLURE  MRN: [id number]  LOCATION: [facility] 10    ----  Patient is a 89y old  Male who presents with a chief complaint of hypoxia (18 Dec 2021 08:25)      FROM ADMISSION H+P:   HPI:  ***Patient with some baseline confusion as per daughter.  Patient also on BiPAP.  Therefore information is limited and collateral obtained from chart and daughter.***    89M with HTN, BLE edema, HLD, hypothyroidism, anxiety, BPH who presents for SOB.  Per daughter, patient has been coughing for the past few days.  No reported fevers.  Still had a good appetite and interacting as usual.  Then tonight patient was noted to be SOB with some audible rales.  Family got concerned and called EMS.  In the ED, patient's vitals were /74    RR 24, 97% on 3L with T 99.8.  Labs remarkable for leukocytosis of 11.1 and .  ABG showed 7.41/35/158.  Also was found to have coronavirus (but not COVID).  Patient was noted to be tachypneic with upper respiratory rales and wheezing.  ED tried nebs and suctioning but patient's respiratory status did not improve.  Therefore he was placed on BiPAP with symptomatic improvement.  ED noted that the patient was tender diffusely in his abdomen and a CT A/P was done and was unremarkable.  Currently patient says he feels fine with no breathing difficulties or pain anywhere.    (17 Dec 2021 05:53)      ----  INTERVAL HPI/OVERNIGHT EVENTS: Pt seen and evaluated at the bedside. No acute overnight events occurred. Pt was given ativan IVP overnight multiple times. He's somnolent now. Remains on bipap. No other new events or concners. Spoke w/ family they plan to visit but they don't want to visit until he has a room upstairs because they don't want to be exposed to anything in the ED. No other concerns.     ----  PAST MEDICAL & SURGICAL HISTORY:  Hypertension    Hyperlipidemia    Hypothyroidism    Anxiety    History of BPH    GERD (gastroesophageal reflux disease)    Bilateral leg edema  was on furosemide but no longer taking        FAMILY HISTORY:  No pertinent family history in first degree relatives        Allergies    No Known Allergies    Intolerances        ----  ANTIMICROBIALS:    CARDIOVASCULAR:  lisinopril 20 milliGRAM(s) Oral daily  metoprolol succinate ER 25 milliGRAM(s) Oral daily    GASTROINTESTINAL:  aluminum hydroxide/magnesium hydroxide/simethicone Suspension 30 milliLiter(s) Oral every 4 hours PRN  pantoprazole  Injectable 40 milliGRAM(s) IV Push daily    PULMONARY:  ALBUTerol    0.083% 2.5 milliGRAM(s) Nebulizer every 6 hours      ----  REVIEW OF SYSTEMS:  unable to obtain at this time    ----  PHYSICAL EXAM:  GENERAL: patient appears of advanced age, nontoxic, no distress, on bipap mask  ENMT: oropharynx clear without erythema, dry mucous membranes  LUNGS: coarse breaths sounds throughout, no wheezing  HEART: soft S1/S2, regular rate and rhythm, no murmurs noted, no noted edema to b/l LE  GASTROINTESTINAL: abdomen is soft, nondistended, hypoactive bowel sounds  MUSCULOSKELETAL: no clubbing or cyanosis, no obvious deformity  NEUROLOGIC: appears sedated, responds to tactile stimuli    T(C): 36.2 (12-18-21 @ 05:30), Max: 38.2 (12-17-21 @ 12:55)  HR: 73 (12-18-21 @ 08:01) (73 - 118)  BP: 127/73 (12-18-21 @ 05:30) (112/51 - 162/89)  RR: 20 (12-18-21 @ 05:30) (17 - 22)  SpO2: 100% (12-18-21 @ 08:01) (95% - 100%)  Wt(kg): --    ----  INTAKE & OUTPUT:  I&O's Summary      LABS:                        9.9    7.03  )-----------( 121      ( 18 Dec 2021 06:57 )             30.4     12-18    136  |  101  |  12  ----------------------------<  158<H>  4.5   |  26  |  0.66    Ca    8.2<L>      18 Dec 2021 06:57  Phos  3.3     12-17  Mg     2.1     12-17    TPro  6.5  /  Alb  2.8<L>  /  TBili  0.6  /  DBili  x   /  AST  31  /  ALT  22  /  AlkPhos  80  12-17    PT/INR - ( 17 Dec 2021 02:01 )   PT: 12.0 sec;   INR: 0.99 ratio         PTT - ( 17 Dec 2021 02:01 )  PTT:29.4 sec    CAPILLARY BLOOD GLUCOSE        ABG - ( 17 Dec 2021 04:46 )  pH, Arterial: 7.41  pH, Blood: x     /  pCO2: 35    /  pO2: 158   / HCO3: 22    / Base Excess: -2.4  /  SaO2: 98.1                  ----  Personally reviewed:  Vital sign trends: [ x ] yes    [  ] no     [  ] n/a  Laboratory results: [ x ] yes    [  ] no     [  ] n/a - leukocytosis improving, h/h stable, renal indices stable, hyponatremia resolved  Radiology results: [ x ] yes    [  ] no     [  ] n/a - cxr clear. ct a/p without acute findings. chronic adrenal mass, rec for MRI eventually which can be done routinely if in line w/ GOC  Microbio results: [ x ] yes    [  ] no     [  ] n/a - cultures pending. rvp reviewed  Consultant recommendations: [ x ] yes    [  ] no     [  ] n/a - d/w pulm        
pt seen  doing well  no complaints  ICU Vital Signs Last 24 Hrs  T(C): 36.4 (20 Dec 2021 05:32), Max: 36.7 (19 Dec 2021 13:30)  T(F): 97.5 (20 Dec 2021 05:32), Max: 98 (19 Dec 2021 13:30)  HR: 90 (20 Dec 2021 07:25) (84 - 104)  BP: 118/62 (20 Dec 2021 05:32) (111/64 - 140/87)  BP(mean): --  ABP: --  ABP(mean): --  RR: 18 (20 Dec 2021 05:32) (16 - 20)  SpO2: 95% (20 Dec 2021 07:25) (94% - 100%)  gen-NAD  LLE- skin tear, stable no blleeidng 
Date/Time Patient Seen:  		  Referring MD:   Data Reviewed	       Patient is a 89y old  Male who presents with a chief complaint of hypoxia (17 Dec 2021 13:57)      Subjective/HPI     PAST MEDICAL & SURGICAL HISTORY:  Hypertension    Hyperlipidemia    Hypothyroidism    Anxiety    History of BPH    GERD (gastroesophageal reflux disease)    Bilateral leg edema  was on furosemide but no longer taking          Medication list         MEDICATIONS  (STANDING):  ALBUTerol    0.083% 2.5 milliGRAM(s) Nebulizer every 6 hours  atorvastatin 40 milliGRAM(s) Oral at bedtime  dextrose 5% + sodium chloride 0.9% with potassium chloride 20 mEq/L 1000 milliLiter(s) (40 mL/Hr) IV Continuous <Continuous>  enoxaparin Injectable 40 milliGRAM(s) SubCutaneous daily  finasteride 5 milliGRAM(s) Oral daily  levothyroxine 25 MICROGram(s) Oral daily  lisinopril 20 milliGRAM(s) Oral daily  methylPREDNISolone sodium succinate Injectable 40 milliGRAM(s) IV Push every 8 hours  metoprolol succinate ER 25 milliGRAM(s) Oral daily  pantoprazole  Injectable 40 milliGRAM(s) IV Push daily    MEDICATIONS  (PRN):  acetaminophen     Tablet .. 650 milliGRAM(s) Oral every 6 hours PRN Temp greater or equal to 38C (100.4F), Mild Pain (1 - 3)  aluminum hydroxide/magnesium hydroxide/simethicone Suspension 30 milliLiter(s) Oral every 4 hours PRN Dyspepsia  melatonin 3 milliGRAM(s) Oral at bedtime PRN Insomnia  ondansetron Injectable 4 milliGRAM(s) IV Push every 8 hours PRN Nausea and/or Vomiting         Vitals log        ICU Vital Signs Last 24 Hrs  T(C): 36.2 (18 Dec 2021 05:30), Max: 38.2 (17 Dec 2021 12:55)  T(F): 97.1 (18 Dec 2021 05:30), Max: 100.7 (17 Dec 2021 12:55)  HR: 75 (18 Dec 2021 08:01) (75 - 118)  BP: 127/73 (18 Dec 2021 05:30) (112/51 - 162/89)  BP(mean): --  ABP: --  ABP(mean): --  RR: 20 (18 Dec 2021 05:30) (17 - 22)  SpO2: 100% (18 Dec 2021 08:01) (78% - 100%)           Input and Output:  I&O's Detail      Lab Data                        9.9    7.03  )-----------( 121      ( 18 Dec 2021 06:57 )             30.4     12-18    136  |  101  |  12  ----------------------------<  158<H>  4.5   |  26  |  0.66    Ca    8.2<L>      18 Dec 2021 06:57  Phos  3.3     12-17  Mg     2.1     12-17    TPro  6.5  /  Alb  2.8<L>  /  TBili  0.6  /  DBili  x   /  AST  31  /  ALT  22  /  AlkPhos  80  12-17    ABG - ( 17 Dec 2021 04:46 )  pH, Arterial: 7.41  pH, Blood: x     /  pCO2: 35    /  pO2: 158   / HCO3: 22    / Base Excess: -2.4  /  SaO2: 98.1                    Review of Systems	      Objective     Physical Examination    heart s1s2  lung dec BS  abd soft  head nc      Pertinent Lab findings & Imaging      Ulisses:  NO   Adequate UO     I&O's Detail           Discussed with:     Cultures:	        Radiology                            
Chief Complaint: Shortness of breath    Interval Events: No changes overnight.    Review of Systems:  Unable to obtain    Physical Exam:  Vitals:        Vital Signs Last 24 Hrs  T(C): 36.7 (21 Dec 2021 09:16), Max: 37.2 (21 Dec 2021 06:09)  T(F): 98 (21 Dec 2021 09:16), Max: 98.9 (21 Dec 2021 06:09)  HR: 91 (21 Dec 2021 09:16) (73 - 104)  BP: 145/73 (21 Dec 2021 09:16) (110/64 - 167/83)  BP(mean): --  RR: 18 (21 Dec 2021 09:16) (16 - 20)  SpO2: 95% (21 Dec 2021 09:16) (91% - 99%)  General: NAD  HEENT: MMM  Neck: No JVD, no carotid bruit  Lungs: CTAB  CV: RRR, nl S1/S2, no M/R/G  Abdomen: S/NT/ND, +BS  Extremities: No LE edema, no cyanosis  Neuro: AAOx0  Skin: No rash    Labs:                        9.1    8.34  )-----------( 189      ( 21 Dec 2021 09:00 )             27.6     12-21    140  |  103  |  26<H>  ----------------------------<  106<H>  3.1<L>   |  28  |  0.78    Ca    8.5      21 Dec 2021 09:00              Telemetry: Sinus rhythm
Subjective: Patient agitated earlier today and recieved medications for sedation.  Currently sleeping and lethargic. Calm.     MEDICATIONS  (STANDING):  ALBUTerol    0.083% 2.5 milliGRAM(s) Nebulizer every 6 hours  atorvastatin 40 milliGRAM(s) Oral at bedtime  dextrose 5% + sodium chloride 0.9% with potassium chloride 20 mEq/L 1000 milliLiter(s) (40 mL/Hr) IV Continuous <Continuous>  enoxaparin Injectable 40 milliGRAM(s) SubCutaneous daily  finasteride 5 milliGRAM(s) Oral daily  folic acid 1 milliGRAM(s) Oral daily  levothyroxine 25 MICROGram(s) Oral daily  lisinopril 20 milliGRAM(s) Oral daily  methylPREDNISolone sodium succinate Injectable 40 milliGRAM(s) IV Push daily  metoprolol succinate ER 25 milliGRAM(s) Oral daily  pantoprazole  Injectable 40 milliGRAM(s) IV Push daily  thiamine Injectable 100 milliGRAM(s) IV Push daily    MEDICATIONS  (PRN):  acetaminophen     Tablet .. 650 milliGRAM(s) Oral every 6 hours PRN Temp greater or equal to 38C (100.4F), Mild Pain (1 - 3)  aluminum hydroxide/magnesium hydroxide/simethicone Suspension 30 milliLiter(s) Oral every 4 hours PRN Dyspepsia  LORazepam   Injectable 0.5 milliGRAM(s) IV Push every 6 hours PRN agitation or anxiety  melatonin 3 milliGRAM(s) Oral at bedtime PRN Insomnia  ondansetron Injectable 4 milliGRAM(s) IV Push every 8 hours PRN Nausea and/or Vomiting      Allergies    No Known Allergies    Intolerances        Vital Signs Last 24 Hrs  T(C): 36.7 (19 Dec 2021 13:30), Max: 37.2 (18 Dec 2021 17:12)  T(F): 98 (19 Dec 2021 13:30), Max: 98.9 (18 Dec 2021 17:12)  HR: 88 (19 Dec 2021 13:34) (74 - 104)  BP: 126/72 (19 Dec 2021 13:30) (122/69 - 146/67)  BP(mean): --  RR: 20 (19 Dec 2021 05:30) (18 - 20)  SpO2: 100% (19 Dec 2021 13:34) (93% - 100%)    PHYSICAL EXAM:  GENERAL: NAD, well-groomed, well-developed  HEAD:  Atraumatic, Normocephalic  ENMT: Moist mucous membranes,   NECK: Supple, No JVD, Normal thyroid  CHEST/LUNG: Clear to auscultation bilaterally; No rales, rhonchi, wheezing, or rubs  HEART: Regular rate and rhythm; No murmurs, rubs, or gallops  ABDOMEN: Soft, Nontender, Nondistended; Bowel sounds present  EXTREMITIES:  2+ Peripheral Pulses, No clubbing, cyanosis, or edema      LABS:                        9.4    17.44 )-----------( 156      ( 19 Dec 2021 07:50 )             28.2     19 Dec 2021 07:50    137    |  102    |  17     ----------------------------<  154    5.0     |  25     |  0.70     Ca    8.4        19 Dec 2021 07:50  Phos  3.2       19 Dec 2021 07:50  Mg     2.3       19 Dec 2021 07:50    TPro  6.0    /  Alb  2.5    /  TBili  0.6    /  DBili  x      /  AST  52     /  ALT  29     /  AlkPhos  70     19 Dec 2021 07:50      Urinalysis Basic - ( 18 Dec 2021 14:15 )    Color: Yellow / Appearance: Clear / S.020 / pH: x  Gluc: x / Ketone: Negative  / Bili: Negative / Urobili: Negative mg/dL   Blood: x / Protein: 15 mg/dL / Nitrite: Positive   Leuk Esterase: Small / RBC: 3-5 /HPF / WBC 6-10   Sq Epi: x / Non Sq Epi: Few / Bacteria: Moderate      CAPILLARY BLOOD GLUCOSE          RADIOLOGY & ADDITIONAL TESTS:    Imaging Personally Reviewed:  [ ] YES     Consultant(s) Notes Reviewed:      Care Discussed with Consultants/Other Providers:    Advanced Directives: [ ] DNR  [ ] No feeding tube  [ ] MOLST in chart  [ ] MOLST completed today  [ ] Unknown  
Date/Time Patient Seen:  		  Referring MD:   Data Reviewed	       Patient is a 89y old  Male who presents with a chief complaint of hypoxia (20 Dec 2021 12:25)      Subjective/HPI     PAST MEDICAL & SURGICAL HISTORY:  Hypertension    Hyperlipidemia    Hypothyroidism    Anxiety    History of BPH    GERD (gastroesophageal reflux disease)    Bilateral leg edema  was on furosemide but no longer taking          Medication list         MEDICATIONS  (STANDING):  ALBUTerol    0.083% 2.5 milliGRAM(s) Nebulizer every 6 hours  atorvastatin 40 milliGRAM(s) Oral at bedtime  cefTRIAXone   IVPB      cefTRIAXone   IVPB 1000 milliGRAM(s) IV Intermittent every 24 hours  finasteride 5 milliGRAM(s) Oral daily  levothyroxine 25 MICROGram(s) Oral daily  lisinopril 20 milliGRAM(s) Oral daily  metoprolol succinate ER 25 milliGRAM(s) Oral daily  pantoprazole  Injectable 40 milliGRAM(s) IV Push daily    MEDICATIONS  (PRN):  acetaminophen     Tablet .. 650 milliGRAM(s) Oral every 6 hours PRN Temp greater or equal to 38C (100.4F), Mild Pain (1 - 3)  aluminum hydroxide/magnesium hydroxide/simethicone Suspension 30 milliLiter(s) Oral every 4 hours PRN Dyspepsia  ondansetron Injectable 4 milliGRAM(s) IV Push every 8 hours PRN Nausea and/or Vomiting         Vitals log        ICU Vital Signs Last 24 Hrs  T(C): 37.2 (21 Dec 2021 06:09), Max: 37.2 (21 Dec 2021 06:09)  T(F): 98.9 (21 Dec 2021 06:09), Max: 98.9 (21 Dec 2021 06:09)  HR: 95 (21 Dec 2021 06:09) (73 - 104)  BP: 110/64 (21 Dec 2021 06:09) (110/64 - 167/83)  BP(mean): --  ABP: --  ABP(mean): --  RR: 18 (21 Dec 2021 06:09) (16 - 20)  SpO2: 91% (21 Dec 2021 06:09) (91% - 99%)           Input and Output:  I&O's Detail      Lab Data                        9.2    8.91  )-----------( 164      ( 20 Dec 2021 07:40 )             28.0     12-20    139  |  102  |  18  ----------------------------<  128<H>  3.5   |  29  |  0.76    Ca    8.7      20 Dec 2021 07:40  Phos  3.2     12-19  Mg     2.3     12-19    TPro  6.0  /  Alb  2.5<L>  /  TBili  0.6  /  DBili  x   /  AST  52<H>  /  ALT  29  /  AlkPhos  70  12-19            Review of Systems	      Objective     Physical Examination    heart s1s2  lung dec BS  abd soft      Pertinent Lab findings & Imaging      Ulisses:  NO   Adequate UO     I&O's Detail           Discussed with:     Cultures:	        Radiology                            
Patient is a 89y old  Male who presents with a chief complaint of hypoxia (17 Dec 2021 06:55)    INTERVAL HPI/OVERNIGHT EVENTS:  patient awake, mildly agitated, answers all questions with "I feel lousy"    MEDICATIONS  (STANDING):  ALBUTerol    0.083% 2.5 milliGRAM(s) Nebulizer every 6 hours  atorvastatin 40 milliGRAM(s) Oral at bedtime  dextrose 5% + sodium chloride 0.9% with potassium chloride 20 mEq/L 1000 milliLiter(s) (40 mL/Hr) IV Continuous <Continuous>  enoxaparin Injectable 40 milliGRAM(s) SubCutaneous daily  finasteride 5 milliGRAM(s) Oral daily  levothyroxine 25 MICROGram(s) Oral daily  lisinopril 20 milliGRAM(s) Oral daily  methylPREDNISolone sodium succinate Injectable 40 milliGRAM(s) IV Push every 8 hours  metoprolol succinate ER 25 milliGRAM(s) Oral daily  pantoprazole  Injectable 40 milliGRAM(s) IV Push daily    MEDICATIONS  (PRN):  acetaminophen     Tablet .. 650 milliGRAM(s) Oral every 6 hours PRN Temp greater or equal to 38C (100.4F), Mild Pain (1 - 3)  aluminum hydroxide/magnesium hydroxide/simethicone Suspension 30 milliLiter(s) Oral every 4 hours PRN Dyspepsia  melatonin 3 milliGRAM(s) Oral at bedtime PRN Insomnia  ondansetron Injectable 4 milliGRAM(s) IV Push every 8 hours PRN Nausea and/or Vomiting      Allergies  No Known Allergies      REVIEW OF SYSTEMS:  unable to obtain    Vital Signs Last 24 Hrs  T(C): 38.2 (17 Dec 2021 12:55), Max: 38.2 (17 Dec 2021 12:55)  T(F): 100.7 (17 Dec 2021 12:55), Max: 100.7 (17 Dec 2021 12:55)  HR: 118 (17 Dec 2021 13:00) (82 - 118)  BP: 162/89 (17 Dec 2021 12:55) (128/60 - 162/89)  BP(mean): --  RR: 22 (17 Dec 2021 12:55) (22 - 24)  SpO2: 100% (17 Dec 2021 13:00) (78% - 100%)    PHYSICAL EXAM:  GENERAL: mildly agitated, on bipap  HEAD:  Atraumatic, Normocephalic  EYES: conjunctiva and sclera clear  ENMT: on bipap  NECK: Supple, No JVD  NERVOUS SYSTEM:  Alert & Oriented X1; All 4 extremities mobile, no gross sensory deficits.   CHEST/LUNG: bilateral mild exp wheeze with diffuse rhonchi  HEART: Regular rate and rhythm;   ABDOMEN: Soft, Nontender, Nondistended; Bowel sounds present  EXTREMITIES:  2+ Peripheral Pulses, trace edema    LABS:                        10.6   13.87 )-----------( 154      ( 17 Dec 2021 13:16 )             32.4     17 Dec 2021 13:16    131    |  98     |  15     ----------------------------<  99     4.1     |  24     |  0.78     Ca    8.2        17 Dec 2021 13:16  Phos  3.3       17 Dec 2021 13:16  Mg     2.1       17 Dec 2021 13:16    TPro  6.5    /  Alb  2.8    /  TBili  0.6    /  DBili  x      /  AST  31     /  ALT  22     /  AlkPhos  80     17 Dec 2021 13:16    PT/INR - ( 17 Dec 2021 02:01 )   PT: 12.0 sec;   INR: 0.99 ratio         PTT - ( 17 Dec 2021 02:01 )  PTT:29.4 sec    CAPILLARY BLOOD GLUCOSE          RADIOLOGY & ADDITIONAL TESTS:    Imaging Personally Reviewed:  [ ] YES     Consultant(s) Notes Reviewed:  pulm    Care Discussed with Consultants/Other Providers:    Advanced Directives: [x ] DNR  [ ] No feeding tube  [ ] MOLST in chart  [ ] MOLST completed today  [ ] Unknown  
Subjective: Patient seen and examined. Overnight recieved lasix due to wheezing.  BNP tripled. Agitation and also recieved ativan.     MEDICATIONS  (STANDING):  ALBUTerol    0.083% 2.5 milliGRAM(s) Nebulizer every 6 hours  atorvastatin 40 milliGRAM(s) Oral at bedtime  cefTRIAXone   IVPB      cefTRIAXone   IVPB 1000 milliGRAM(s) IV Intermittent every 24 hours  finasteride 5 milliGRAM(s) Oral daily  folic acid 1 milliGRAM(s) Oral daily  levothyroxine 25 MICROGram(s) Oral daily  lisinopril 20 milliGRAM(s) Oral daily  methylPREDNISolone sodium succinate Injectable 40 milliGRAM(s) IV Push daily  metoprolol succinate ER 25 milliGRAM(s) Oral daily  pantoprazole  Injectable 40 milliGRAM(s) IV Push daily  thiamine Injectable 100 milliGRAM(s) IV Push daily    MEDICATIONS  (PRN):  acetaminophen     Tablet .. 650 milliGRAM(s) Oral every 6 hours PRN Temp greater or equal to 38C (100.4F), Mild Pain (1 - 3)  aluminum hydroxide/magnesium hydroxide/simethicone Suspension 30 milliLiter(s) Oral every 4 hours PRN Dyspepsia  LORazepam   Injectable 0.5 milliGRAM(s) IV Push every 6 hours PRN agitation or anxiety  melatonin 3 milliGRAM(s) Oral at bedtime PRN Insomnia  ondansetron Injectable 4 milliGRAM(s) IV Push every 8 hours PRN Nausea and/or Vomiting      Allergies    No Known Allergies    Intolerances        Vital Signs Last 24 Hrs  T(C): 36.7 (20 Dec 2021 09:35), Max: 36.7 (19 Dec 2021 13:30)  T(F): 98 (20 Dec 2021 09:35), Max: 98 (19 Dec 2021 13:30)  HR: 95 (20 Dec 2021 09:35) (84 - 104)  BP: 144/77 (20 Dec 2021 09:35) (111/64 - 144/77)  BP(mean): --  RR: 17 (20 Dec 2021 09:35) (16 - 20)  SpO2: 98% (20 Dec 2021 09:35) (94% - 100%)    PHYSICAL EXAM:  GENERAL: NAD, well-groomed, well-developed  HEAD:  Atraumatic, Normocephalic  ENMT: Moist mucous membranes,   NECK: Supple, No JVD, Normal thyroid  NERVOUS SYSTEM:  All 4 extremities mobile, no gross sensory deficits.   CHEST/LUNG: Clear to auscultation bilaterally; No rales, rhonchi, wheezing, or rubs  HEART: Regular rate and rhythm; No murmurs, rubs, or gallops  ABDOMEN: Soft, Nontender, Nondistended; Bowel sounds present  EXTREMITIES:  2+ Peripheral Pulses, No clubbing, cyanosis, or edema      LABS:                        9.2    8.91  )-----------( 164      ( 20 Dec 2021 07:40 )             28.0     20 Dec 2021 07:40    139    |  102    |  18     ----------------------------<  128    3.5     |  29     |  0.76     Ca    8.7        20 Dec 2021 07:40        Urinalysis Basic - ( 18 Dec 2021 14:15 )    Color: Yellow / Appearance: Clear / S.020 / pH: x  Gluc: x / Ketone: Negative  / Bili: Negative / Urobili: Negative mg/dL   Blood: x / Protein: 15 mg/dL / Nitrite: Positive   Leuk Esterase: Small / RBC: 3-5 /HPF / WBC 6-10   Sq Epi: x / Non Sq Epi: Few / Bacteria: Moderate      CAPILLARY BLOOD GLUCOSE          RADIOLOGY & ADDITIONAL TESTS:    Imaging Personally Reviewed:  [ ] YES     Consultant(s) Notes Reviewed:      Care Discussed with Consultants/Other Providers:    Advanced Directives: [ ] DNR  [ ] No feeding tube  [ ] MOLST in chart  [ ] MOLST completed today  [ ] Unknown  
pt seen  doing well  no issues  ICU Vital Signs Last 24 Hrs  T(C): 36.7 (21 Dec 2021 09:16), Max: 37.2 (21 Dec 2021 06:09)  T(F): 98 (21 Dec 2021 09:16), Max: 98.9 (21 Dec 2021 06:09)  HR: 91 (21 Dec 2021 09:16) (73 - 97)  BP: 145/73 (21 Dec 2021 09:16) (110/64 - 167/83)  BP(mean): --  ABP: --  ABP(mean): --  RR: 18 (21 Dec 2021 09:16) (16 - 20)  SpO2: 95% (21 Dec 2021 09:16) (91% - 99%)  gen-NAD  resp-clear  abd-soft NT/ND  LLE skin tear stable

## 2021-12-21 NOTE — DIETITIAN INITIAL EVALUATION ADULT. - OTHER INFO
Per H&P, pt is a "89M with HTN, BLE edema, HLD, hypothyroidism, anxiety, BPH who presents for SOB.  Per daughter, patient has been coughing for the past few days.  No reported fevers.  Still had a good appetite and interacting as usual. patient was noted to be SOB with some audible rales.  Family got concerned and called EMS.  Also was found to have coronavirus (but not COVID).  Patient was noted to be tachypneic with upper respiratory rales and wheezing.  ED tried nebs and suctioning but patient's respiratory status did not improve.  Therefore he was placed on BiPAP with symptomatic improvement.  ED noted that the patient was tender diffusely in his abdomen and a CT A/P was done and was unremarkable."    Pt seen for nutrition assessment secondary to NPO status (pt currently NPO x 5 days). Per H&P, pt is a "89M with HTN, BLE edema, HLD, hypothyroidism, anxiety, BPH who presents for SOB.  Per daughter, patient has been coughing for the past few days.  No reported fevers.  Still had a good appetite and interacting as usual. patient was noted to be SOB with some audible rales.  Family got concerned and called EMS.  Also was found to have coronavirus (but not COVID).  Patient was noted to be tachypneic with upper respiratory rales and wheezing.  ED tried nebs and suctioning but patient's respiratory status did not improve.  Therefore he was placed on BiPAP with symptomatic improvement.  ED noted that the patient was tender diffusely in his abdomen and a CT A/P was done and was unremarkable."    Pt seen for nutrition assessment secondary to NPO status (pt currently NPO x 5 days).  Pt confused and unable to provide nutrition related hx. SLP evaluation attempted on 12/17, however, secondary to requirement of Bipap, swallowing evaluation unable to be completed.  SLP was reconsulted for evaluation today, noted patient not agreeable to PO trials and diet order deferred to MD.  Palliative care consulted; [facility] ongoing - noted pt to be evaluated for home or facility hospice care.  Noted pt with periods of agitation and combative behavior; has received ativan throughout admission.  Recommend consider initiation of pleasure feeds, pt requires full assistance with meals.  Recommend consider gentle IVFs.  Skin: L shin laceration.  RD to remain available and will follow-up per protocol.

## 2021-12-21 NOTE — CONSULT NOTE ADULT - TIME BILLING
Time spent on total encounter assessing patient, examination, chart review, counseling and coordinating care by the attending physician/nurse/care manager.

## 2021-12-21 NOTE — PROGRESS NOTE ADULT - REASON FOR ADMISSION
hypoxia
Anesthesia Volume In Cc: 3

## 2021-12-21 NOTE — PROGRESS NOTE ADULT - PROVIDER SPECIALTY LIST ADULT
Surgery
Cardiology
Pulmonology
Hospitalist
Hospitalist
Pulmonology
Hospitalist
Pulmonology
Pulmonology
Surgery
Hospitalist

## 2021-12-21 NOTE — DISCHARGE NOTE PROVIDER - HOSPITAL COURSE
89M HTN, HLD, BPH, Hypothyroidism and Anxiety admitted for Acute Hypoxic Respiratory Failure. Patient has recovered from the Acute Hypoxic Respiratory Failure but has had more acute on chronic decline with his severe dementia.  Patient's dementia has been difficult to control and requiring multiple medications. Spoke to Daughter regarding advanced directives.  She is health care proxy.  Has baseline dementia. Will bring in advanced directives for us to evaluate.  Patient is DNR.  Patient has been discharged to inpatient Hospice. 89M HTN, HLD, BPH, Hypothyroidism and Anxiety admitted for Acute Hypoxic Respiratory Failure. Patient has recovered from the Acute Hypoxic Respiratory Failure but has had more acute on chronic decline with his severe dementia.  Patient's dementia has been difficult to control and requiring multiple medications. Spoke to Daughter regarding advanced directives.  She is health care proxy.  Has baseline dementia. Will bring in advanced directives for us to evaluate.  Patient is DNR.  Patient has been discharged to inpatient Hospice. Discharge planning 60 minutes

## 2021-12-21 NOTE — CONSULT NOTE ADULT - ASSESSMENT
1.  2.  3.     Please gently cleanse with NS, pat dry cover with xeroform, 4x4, senait NO TAPE TO SKIN    Wound #:   Location:   Size:    Drainage:   Tunneling/Size:   Wound Type:  Undermining:   Periwound:   Foul Odor:   Necrotic Tissue:   Granulation:   Fibrinous Exudate:  Exposure:  Product:   Frequency:   Treatment:   Granulation:     RN assessed patient to have stage two pressure injuries bilateral buttocks  RN to manage patients stage 2 pressure injuries with [place] daily, critic aid Q-shift, turn and reposition Q2H.  Patient is on a low air loss mattress    Continue  Nutrition (as tolerated)  Continue  Offloading   Continue Pericare  Apply cair boots at all times while in bed.   Provide skin checks and foot placement q8h.  Care as per medicine will follow w/ you  Follow up as outpatient at [facility]   Findings and recommendations discussed with RN  MD   Thank you for this consult  Mine Panchal NP, [facility] [phone] 1. Left arm upper and lower skin tears with steri strips in place dry with scant serosanguinous drainage, erythema  No odor, warmth, tenderness, induration, fluctuance: Skin is  frail, with ecchymosis w/o hematoma    2. Left lower leg upper and lower skin tears with steri strips in place dry with scant serosanguinous drainage, erythema  No odor, warmth, tenderness, induration, fluctuance: Skin is  frail, with ecchymosis w/o hematoma    3. Right arm upper skin tear with steri strips in place dry with scant serosanguinous drainage, erythema  No odor, warmth, tenderness, induration, fluctuance: Skin is  frail, with ecchymosis w/o hematoma    Recommend to: Gently cleanse with NS, pat dry cover with xeroform, 4x4, senait NO TAPE TO SKIN    Continue  Nutrition (as tolerated)  Continue  Offloading   Continue Pericare  Apply cair boots at all times while in bed.   Provide skin checks and foot placement q8h.  Care as per medicine will follow w/ you  Findings and recommendations discussed with JO Cox   Thank you for this consult  Mine Panchal NP, [facility] [phone]

## 2021-12-21 NOTE — SWALLOW BEDSIDE ASSESSMENT ADULT - COMMENTS
Consult received and chart reviewed. Per charting, pt was weaned from BiPAP to NC earlier this AM. Attempted clinical swallow assessment this AM. Upon arrival, RN and RT reported pt has been placed back on BiPAP due to desaturation to the 70s. Dr. Vann present and made aware. Please reconsult this service when pt is able to tolerate NC or room air for at least 1 to 2 hours to reduce risk of aspiration and subsequent respiratory decline. Consider short-term non-oral means of nutrition/hydration/medication to ensure pt is meeting adequate daily caloric needs and reduce aspiration risk. Discussed with Dr. Vann, who is in agreement with POC.
Consult received and chart reviewed. Patient seen at bedside this AM for initial assessment of swallow function. Patient was alert. Patient unable to follow simple commands, despite max prompting. Patient presented with a wet vocal quality at baseline.    Per charting, Patient is a "89M with HTN, BLE edema, HLD, hypothyroidism, anxiety, BPH who presents for SOB.  Per daughter, patient has been coughing for the past few days.  No reported fevers.  Still had a good appetite and interacting as usual.  Then tonight patient was noted to be SOB with some audible rales.  Family got concerned and called EMS.  In the ED, patient's vitals were /74    RR 24, 97% on 3L with T 99.8.  Labs remarkable for leukocytosis of 11.1 and .  ABG showed 7.41/35/158.  Also was found to have coronavirus (but not COVID).  Patient was noted to be tachypneic with upper respiratory rales and wheezing.  ED tried nebs and suctioning but patient's respiratory status did not improve.  Therefore he was placed on BiPAP with symptomatic improvement.  ED noted that the patient was tender diffusely in his abdomen and a CT A/P was done and was unremarkable.  Currently patient says he feels fine with no breathing difficulties or pain anywhere." WBC WFL. CXR 12/17/21 revealed "No consolidation or effusion"    Discussed results and recommendations with RN and MD via phone.

## 2021-12-21 NOTE — SWALLOW BEDSIDE ASSESSMENT ADULT - SLP PERTINENT HISTORY OF CURRENT PROBLEM
r/o dysphagia
Per charting, "89M with HTN, BLE edema, HLD, hypothyroidism, anxiety, BPH who presents for SOB.  Found to have +coronavirus (not COVID).  On BiPAP with symptomatic improvement."

## 2021-12-21 NOTE — SWALLOW BEDSIDE ASSESSMENT ADULT - SWALLOW EVAL: RECOMMENDED DIET
Defer to MD order d/t Patient not agreeable to PO trials and consider short-term means of non-oral nutrition/hydration/medication and GOC discussion with family regarding nutritional plan of care

## 2021-12-21 NOTE — DISCHARGE NOTE PROVIDER - NSDCMRMEDTOKEN_GEN_ALL_CORE_FT
atorvastatin 40 mg oral tablet: 1 tab(s) orally once a day  finasteride 5 mg oral tablet: 1 tab(s) orally once a day  levothyroxine 25 mcg (0.025 mg) oral capsule: 1 cap(s) orally once a day  lisinopril 20 mg oral tablet: 1 tab(s) orally once a day  metoprolol tartrate 25 mg oral tablet:   omeprazole 20 mg oral delayed release capsule: 1 cap(s) orally once a day

## 2021-12-21 NOTE — DISCHARGE NOTE NURSING/CASE MANAGEMENT/SOCIAL WORK - NSDCPEFALRISK_GEN_ALL_CORE
For information on Fall & Injury Prevention, visit: [email or web address] OR  [email or web address] OR  https://www.cdc.gov/steadi/patient.html

## 2021-12-21 NOTE — DIETITIAN INITIAL EVALUATION ADULT. - NUTRITIONGOAL OUTCOME1
pt to tolerate diet via safest route, po diet recommendations per swallow eval ongoing GOC conversation; diet deferred to MD

## 2021-12-21 NOTE — DIETITIAN INITIAL EVALUATION ADULT. - SIGNS/SYMPTOMS
as evidenced by acute resp distress, reports of agitation, NPO status x 5 day, pending SLP eval as evidenced by acute resp distress, agitation, NPO status x 5 day, SLP eval, palliative care f/u

## 2021-12-21 NOTE — SWALLOW BEDSIDE ASSESSMENT ADULT - SWALLOW EVAL: DIAGNOSIS
1. Patient presented with coating of pureed on tsp; however Patient not agreeable to trials, evidenced by tight labial seal, shaking head no, and stating "no" despite max encouragement and multiple attempts. Unable to administer PO trials and assess oral and pharyngeal phases at this time. 2. Recommend defer to MD order d/t Patient not agreeable to PO trials and consider short-term means of non-oral nutrition/hydration/medication and GOC discussion with family regarding nutritional plan of care. Discussed with MD this department to re-attempt swallow assessment x1, as schedule permits.

## 2021-12-21 NOTE — DISCHARGE NOTE PROVIDER - NSDCCPCAREPLAN_GEN_ALL_CORE_FT
PRINCIPAL DISCHARGE DIAGNOSIS  Diagnosis: Viral pneumonia  Assessment and Plan of Treatment: Resolved.  Off Oxygen.      SECONDARY DISCHARGE DIAGNOSES  Diagnosis: Respiratory distress  Assessment and Plan of Treatment: Resolved.

## 2021-12-21 NOTE — DISCHARGE NOTE PROVIDER - ATTENDING DISCHARGE PHYSICAL EXAMINATION:
PHYSICAL EXAM:  Vital Signs Last 24 Hrs  T(C): 36.7 (21 Dec 2021 09:16), Max: 37.2 (21 Dec 2021 06:09)  T(F): 98 (21 Dec 2021 09:16), Max: 98.9 (21 Dec 2021 06:09)  HR: 91 (21 Dec 2021 09:16) (73 - 104)  BP: 145/73 (21 Dec 2021 09:16) (110/64 - 167/83)  BP(mean): --  RR: 18 (21 Dec 2021 09:16) (16 - 20)  SpO2: 95% (21 Dec 2021 09:16) (91% - 99%)    GENERAL: Lethargic   HEAD:  Atraumatic, Normocephalic  ENMT: No tonsillar erythema, exudates, or enlargement; Moist mucous membranes, Good dentition, No lesions  NECK: Supple, No JVD, Normal thyroid  CHEST/LUNG: Clear to auscultation bilaterally; No rales, rhonchi, wheezing, or rubs  HEART: Regular rate and rhythm; No murmurs, rubs, or gallops  ABDOMEN: Soft, Nontender, Nondistended; Bowel sounds present  EXTREMITIES:  2+ Peripheral Pulses, No clubbing, cyanosis, or edema

## 2021-12-21 NOTE — CONSULT NOTE ADULT - SUBJECTIVE AND OBJECTIVE BOX
HPI:  ***Patient with some baseline confusion as per daughter.  Patient also on BiPAP.  Therefore information is limited and collateral obtained from chart and daughter.***    89M with HTN, BLE edema, HLD, hypothyroidism, anxiety, BPH who presents for SOB.  Per daughter, patient has been coughing for the past few days.  No reported fevers.  Still had a good appetite and interacting as usual.  Then tonight patient was noted to be SOB with some audible rales.  Family got concerned and called EMS.  In the ED, patient's vitals were /74    RR 24, 97% on 3L with T 99.8.  Labs remarkable for leukocytosis of 11.1 and .  ABG showed 7.41/35/158.  Also was found to have coronavirus (but not COVID).  Patient was noted to be tachypneic with upper respiratory rales and wheezing.  ED tried nebs and suctioning but patient's respiratory status did not improve.  Therefore he was placed on BiPAP with symptomatic improvement.  ED noted that the patient was tender diffusely in his abdomen and a CT A/P was done and was unremarkable.  Currently patient says he feels fine with no breathing difficulties or pain anywhere.    (17 Dec 2021 05:53)    PERTINENT PM/SXH:   Hypertension    Hyperlipidemia    Hypothyroidism    Anxiety    History of BPH    GERD (gastroesophageal reflux disease)    Bilateral leg edema        FAMILY HISTORY:  No pertinent family history in first degree relatives      ITEMS NOT CHECKED ARE NOT PRESENT    SOCIAL HISTORY:   Significant other/partner[ ]  Children[ ]  Rastafarian/Spirituality:  Substance hx:  [ ]   Tobacco hx:  [ ]   Alcohol hx: [ ]   Home Opioid hx:  [ ] I-Stop Reference No:  Living Situation: [ ]Home  [ ]Long term care  [ ]Rehab [ ]Other    ADVANCE DIRECTIVES:    DNR  MOLST  [ ]  Living Will  [ ]   DECISION MAKER(s):  [ ] Health Care Proxy(s)  [ ] Surrogate(s)  [ ] Guardian           Name(s): Phone Number(s):    BASELINE (I)ADL(s) (prior to admission):  [place]: [ ]Total  [ ] Moderate [ ]Dependent    Allergies    No Known Allergies    Intolerances    MEDICATIONS  (STANDING):  ALBUTerol    0.083% 2.5 milliGRAM(s) Nebulizer every 6 hours  atorvastatin 40 milliGRAM(s) Oral at bedtime  cefTRIAXone   IVPB      cefTRIAXone   IVPB 1000 milliGRAM(s) IV Intermittent every 24 hours  finasteride 5 milliGRAM(s) Oral daily  levothyroxine 25 MICROGram(s) Oral daily  lisinopril 20 milliGRAM(s) Oral daily  metoprolol succinate ER 25 milliGRAM(s) Oral daily  pantoprazole  Injectable 40 milliGRAM(s) IV Push daily  potassium chloride  10 mEq/100 mL IVPB 10 milliEquivalent(s) IV Intermittent every 1 hour    MEDICATIONS  (PRN):  acetaminophen     Tablet .. 650 milliGRAM(s) Oral every 6 hours PRN Temp greater or equal to 38C (100.4F), Mild Pain (1 - 3)  aluminum hydroxide/magnesium hydroxide/simethicone Suspension 30 milliLiter(s) Oral every 4 hours PRN Dyspepsia  OLANZapine Injectable 5 milliGRAM(s) IntraMuscular every 8 hours PRN Agitation  ondansetron Injectable 4 milliGRAM(s) IV Push every 8 hours PRN Nausea and/or Vomiting    PRESENT SYMPTOMS: [ ]Unable to obtain due to poor mentation   Source if other than patient:  [ ]Family   [ ]Team     Pain: [ ]yes [ ]no  QOL impact -   Location -                    Aggravating factors -  Quality -  Radiation -  Timing-  Severity (0-10 scale):  Minimal acceptable level (0-10 scale):     CPOT:    https://www.[facility].org/getattachment/[email or web address]/Critical-Care-Pain-Observation-Tool-(CPOT)      PAIN AD Score:     http://geriatrictoolkit.[place].[facility]/cog/painad.pdf (press ctrl +  left click to view)    Dyspnea:                           [ ]Mild [ ]Moderate [ ]Severe  Anxiety:                             [ ]Mild [ ]Moderate [ ]Severe  Fatigue:                             [ ]Mild [ ]Moderate [ ]Severe  Nausea:                             [ ]Mild [ ]Moderate [ ]Severe  Loss of appetite:              [ ]Mild [ ]Moderate [ ]Severe  Constipation:                    [ ]Mild [ ]Moderate [ ]Severe    Other Symptoms:  [ ]All other review of systems negative     Palliative Performance Status Version 2:         %    http://[facility].org/files/news/palliative_performance_scale_ppsv2.pdf  PHYSICAL EXAM:  Vital Signs Last 24 Hrs  T(C): 36.7 (21 Dec 2021 09:16), Max: 37.2 (21 Dec 2021 06:09)  T(F): 98 (21 Dec 2021 09:16), Max: 98.9 (21 Dec 2021 06:09)  HR: 91 (21 Dec 2021 09:16) (73 - 104)  BP: 145/73 (21 Dec 2021 09:16) (110/64 - 167/83)  BP(mean): --  RR: 18 (21 Dec 2021 09:16) (16 - 20)  SpO2: 95% (21 Dec 2021 09:16) (91% - 99%) I&O's Summary    GENERAL:  [ ]Alert  [ ]Oriented x   [ ]Lethargic  [ ]Cachexia  [ ]Unarousable  [ ]Verbal  [ ]Non-Verbal  Behavioral:   [ ] Anxiety  [ ] Delirium [ ] Agitation [ ] Other  HEENT:  [ ]Normal   [ ]Dry mouth   [ ]ET Tube/Trach  [ ]Oral lesions  PULMONARY:   [ ]Clear [ ]Tachypnea  [ ]Audible excessive secretions   [ ]Rhonchi        [ ]Right [ ]Left [ ]Bilateral  [ ]Crackles        [ ]Right [ ]Left [ ]Bilateral  [ ]Wheezing     [ ]Right [ ]Left [ ]Bilateral  [ ]Diminished breath sounds [ ]right [ ]left [ ]bilateral  CARDIOVASCULAR:    [ ]Regular [ ]Irregular [ ]Tachy  [ ]Santhosh [ ]Murmur [ ]Other  GASTROINTESTINAL:  [ ]Soft  [ ]Distended   [ ]+BS  [ ]Non tender [ ]Tender  [ ]PEG [ ]OGT/ NGT  Last BM:   GENITOURINARY:  [ ]Normal [ ] Incontinent   [ ]Oliguria/Anuria   [ ]Gtz  MUSCULOSKELETAL:   [ ]Normal   [ ]Weakness  [ ]Bed/Wheelchair bound [ ]Edema  NEUROLOGIC:   [ ]No focal deficits  [ ]Cognitive impairment  [ ]Dysphagia [ ]Dysarthria [ ]Paresis [ ]Other   SKIN:   [ ]Normal    [ ]Rash  [ ]Pressure ulcer(s)       Present on admission [ ]y [ ]n    CRITICAL CARE:  [ ] Shock Present  [ ]Septic [ ]Cardiogenic [ ]Neurologic [ ]Hypovolemic  [ ]  Vasopressors [ ]  Inotropes   [ ]Respiratory failure present [ ]Mechanical ventilation [ ]Non-invasive ventilatory support [ ]High flow    [ ]Acute  [ ]Chronic [ ]Hypoxic  [ ]Hypercarbic [ ]Other  [ ]Other organ failure     LABS:                        9.1    8.34  )-----------( 189      ( 21 Dec 2021 09:00 )             27.6   12-21    140  |  103  |  26<H>  ----------------------------<  106<H>  3.1<L>   |  28  |  0.78    Ca    8.5      21 Dec 2021 09:00          RADIOLOGY & ADDITIONAL STUDIES: reviewed    PROTEIN CALORIE MALNUTRITION PRESENT: [ ]mild [ ]moderate [ ]severe [ ]underweight [ ]morbid obesity  https://www.andeal.org/vault/2440/web/files/ONC/Table_Clinical%20Characteristics%20to%20Document%20Malnutrition-White%20JV%20et%20al%202012.pdf    Height (cm): 180.3 (12-17-21 @ 01:32), 180.3 (03-27-21 @ 01:41)  Weight (kg): 68 (12-17-21 @ 01:32), 81.6 (03-27-21 @ 01:41)  BMI (kg/m2): 20.9 (12-17-21 @ 01:32), 25.1 (03-27-21 @ 01:41)    [x]PPSV2 < or = to 30% [ ]significant weight loss  [ ]poor nutritional intake  [ ]anasarca      [ ]Artificial Nutrition      REFERRALS:   [ ]Chaplaincy  [x ]Hospice  [ ]Child Life  [ ]Social Work  [ ]Case management [ ]Holistic Therapy     Goals of Care Document:  HPI:  ***Patient with some baseline confusion as per daughter.  Patient also on BiPAP.  Therefore information is limited and collateral obtained from chart and daughter.***    89M with HTN, BLE edema, HLD, hypothyroidism, anxiety, BPH who presents for SOB.  Per daughter, patient has been coughing for the past few days.  No reported fevers.  Still had a good appetite and interacting as usual.  Then tonight patient was noted to be SOB with some audible rales.  Family got concerned and called EMS.  In the ED, patient's vitals were /74    RR 24, 97% on 3L with T 99.8.  Labs remarkable for leukocytosis of 11.1 and .  ABG showed 7.41/35/158.  Also was found to have coronavirus (but not COVID).  Patient was noted to be tachypneic with upper respiratory rales and wheezing.  ED tried nebs and suctioning but patient's respiratory status did not improve.  Therefore he was placed on BiPAP with symptomatic improvement.  ED noted that the patient was tender diffusely in his abdomen and a CT A/P was done and was unremarkable.  Currently patient says he feels fine with no breathing difficulties or pain anywhere.    (17 Dec 2021 05:53)    PERTINENT PM/SXH:   Hypertension    Hyperlipidemia    Hypothyroidism    Anxiety    History of BPH    GERD (gastroesophageal reflux disease)    Bilateral leg edema        FAMILY HISTORY:  No pertinent family history in first degree relatives      ITEMS NOT CHECKED ARE NOT PRESENT    SOCIAL HISTORY:   Significant other/partner[ ]  Children[x ]  Christian/Spirituality:  Substance hx:  [ ]   Tobacco hx:  [ ]   Alcohol hx: [ ]  (x) none  Home Opioid hx:  [ ] I-Stop Reference No:  Living Situation: [x ]Home  [ ]Long term care  [ ]Rehab [ ]Other    ADVANCE DIRECTIVES:    DNR  MOLST  [ x]  Living Will  [ ]   DECISION MAKER(s):  [ ] Health Care Proxy(s)  [ ] Surrogate(s)  [ ] Guardian           Name(s): Phone Number(s):    BASELINE (I)ADL(s) (prior to admission):  [place]: [ ]Total  [ ] Moderate [x ]Dependent    Allergies    No Known Allergies    Intolerances    MEDICATIONS  (STANDING):  ALBUTerol    0.083% 2.5 milliGRAM(s) Nebulizer every 6 hours  atorvastatin 40 milliGRAM(s) Oral at bedtime  cefTRIAXone   IVPB      cefTRIAXone   IVPB 1000 milliGRAM(s) IV Intermittent every 24 hours  finasteride 5 milliGRAM(s) Oral daily  levothyroxine 25 MICROGram(s) Oral daily  lisinopril 20 milliGRAM(s) Oral daily  metoprolol succinate ER 25 milliGRAM(s) Oral daily  pantoprazole  Injectable 40 milliGRAM(s) IV Push daily  potassium chloride  10 mEq/100 mL IVPB 10 milliEquivalent(s) IV Intermittent every 1 hour    MEDICATIONS  (PRN):  acetaminophen     Tablet .. 650 milliGRAM(s) Oral every 6 hours PRN Temp greater or equal to 38C (100.4F), Mild Pain (1 - 3)  aluminum hydroxide/magnesium hydroxide/simethicone Suspension 30 milliLiter(s) Oral every 4 hours PRN Dyspepsia  OLANZapine Injectable 5 milliGRAM(s) IntraMuscular every 8 hours PRN Agitation  ondansetron Injectable 4 milliGRAM(s) IV Push every 8 hours PRN Nausea and/or Vomiting    PRESENT SYMPTOMS: [x ]Unable to obtain due to poor mentation   Source if other than patient:  [ ]Family   [ ]Team     Pain: [ ]yes [ ]no  QOL impact -   Location -                    Aggravating factors -  Quality -  Radiation -  Timing-  Severity (0-10 scale):  Minimal acceptable level (0-10 scale):     CPOT:    https://www.[facility].org/getattachment/[email or web address]-7894r7872m9j/Critical-Care-Pain-Observation-Tool-(CPOT)      PAIN AD Score:     http://geriatrictoolkit.[facility]/cog/painad.pdf (press ctrl +  left click to view)    Dyspnea:                           [ ]Mild [ ]Moderate [ ]Severe  Anxiety:                             [ ]Mild [ ]Moderate [ ]Severe  Fatigue:                             [ ]Mild [ ]Moderate [ ]Severe  Nausea:                             [ ]Mild [ ]Moderate [ ]Severe  Loss of appetite:              [ ]Mild [ ]Moderate [ ]Severe  Constipation:                    [ ]Mild [ ]Moderate [ ]Severe    Other Symptoms:  [ ]All other review of systems negative     Palliative Performance Status Version 2:      10   %    http://[facility].org/files/news/palliative_performance_scale_ppsv2.pdf  PHYSICAL EXAM:  Vital Signs Last 24 Hrs  T(C): 36.7 (21 Dec 2021 09:16), Max: 37.2 (21 Dec 2021 06:09)  T(F): 98 (21 Dec 2021 09:16), Max: 98.9 (21 Dec 2021 06:09)  HR: 91 (21 Dec 2021 09:16) (73 - 104)  BP: 145/73 (21 Dec 2021 09:16) (110/64 - 167/83)  BP(mean): --  RR: 18 (21 Dec 2021 09:16) (16 - 20)  SpO2: 95% (21 Dec 2021 09:16) (91% - 99%) I&O's Summary    GENERAL: NAD  HEAD:  Atraumatic, Normocephalic  ENMT: Moist mucous membranes,   NECK: Supple, No JVD, Normal thyroid  NERVOUS SYSTEM:  All 4 extremities mobile, no gross sensory deficits, does not follow commands  CHEST/LUNG: Clear to auscultation bilaterally; No rales, rhonchi, wheezing, or rubs  HEART: Regular rate and rhythm; No murmurs, rubs, or gallops  ABDOMEN: Soft, Nontender, Nondistended; Bowel sounds present  EXTREMITIES:  2+ Peripheral Pulses, No clubbing, cyanosis, or edema    LABS:                        9.1    8.34  )-----------( 189      ( 21 Dec 2021 09:00 )             27.6   12-21    140  |  103  |  26<H>  ----------------------------<  106<H>  3.1<L>   |  28  |  0.78    Ca    8.5      21 Dec 2021 09:00          RADIOLOGY & ADDITIONAL STUDIES: reviewed    PROTEIN CALORIE MALNUTRITION PRESENT: [ ]mild [ ]moderate [ ]severe [ ]underweight [ ]morbid obesity  https://www.andeal.org/vault/2440/web/files/ONC/Table_Clinical%20Characteristics%20to%20Document%20Malnutrition-White%20JV%20et%20al%202012.pdf    Height (cm): 180.3 (12-17-21 @ 01:32), 180.3 (03-27-21 @ 01:41)  Weight (kg): 68 (12-17-21 @ 01:32), 81.6 (03-27-21 @ 01:41)  BMI (kg/m2): 20.9 (12-17-21 @ 01:32), 25.1 (03-27-21 @ 01:41)    [x]PPSV2 < or = to 30% [ ]significant weight loss  [ ]poor nutritional intake  [ ]anasarca      [ ]Artificial Nutrition      REFERRALS:   [ ]Chaplaincy  [x ]Hospice  [ ]Child Life  [ ]Social Work  [ ]Case management [ ]Holistic Therapy     Goals of Care Document:

## 2021-12-21 NOTE — PROGRESS NOTE ADULT - ASSESSMENT
89M with HTN, BLE edema, HLD, hypothyroidism, anxiety, BPH who presents for SOB.  Found to have +coronavirus    vs noted  GOC updated  DNR DNI  Cardio eval reviewed  monitor VS and Sat  s/p Steroids - nebs -   not a heavy drinker  underlying dementia       TTE - reviewed  URI - sx management - on emp ABX  CT abd - atelectasis - right adrenal nodule - diverticular disease  monitor VS and HD and Sat  SLP eval   ABG noted  GOC discussion documented

## 2021-12-21 NOTE — DISCHARGE NOTE NURSING/CASE MANAGEMENT/SOCIAL WORK - PATIENT PORTAL LINK FT
You can access the FollowMyHealth Patient Portal offered by [facility] by registering at the following website: [email or web address]. By joining [employer]’s FollowMyHealth portal, you will also be able to view your health information using other applications (apps) compatible with our system.

## 2021-12-22 ENCOUNTER — NON-APPOINTMENT (OUTPATIENT)
Age: 86
End: 2021-12-22

## 2021-12-22 LAB
CULTURE RESULTS: SIGNIFICANT CHANGE UP
CULTURE RESULTS: SIGNIFICANT CHANGE UP
SPECIMEN SOURCE: SIGNIFICANT CHANGE UP
SPECIMEN SOURCE: SIGNIFICANT CHANGE UP

## 2021-12-30 PROCEDURE — 82728 ASSAY OF FERRITIN: CPT

## 2021-12-30 PROCEDURE — 85027 COMPLETE CBC AUTOMATED: CPT

## 2021-12-30 PROCEDURE — 83735 ASSAY OF MAGNESIUM: CPT

## 2021-12-30 PROCEDURE — 85610 PROTHROMBIN TIME: CPT

## 2021-12-30 PROCEDURE — 36415 COLL VENOUS BLD VENIPUNCTURE: CPT

## 2021-12-30 PROCEDURE — 83690 ASSAY OF LIPASE: CPT

## 2021-12-30 PROCEDURE — 81001 URINALYSIS AUTO W/SCOPE: CPT

## 2021-12-30 PROCEDURE — 94760 N-INVAS EAR/PLS OXIMETRY 1: CPT

## 2021-12-30 PROCEDURE — 83540 ASSAY OF IRON: CPT

## 2021-12-30 PROCEDURE — 96360 HYDRATION IV INFUSION INIT: CPT

## 2021-12-30 PROCEDURE — 85379 FIBRIN DEGRADATION QUANT: CPT

## 2021-12-30 PROCEDURE — 93306 TTE W/DOPPLER COMPLETE: CPT

## 2021-12-30 PROCEDURE — 0225U NFCT DS DNA&RNA 21 SARSCOV2: CPT

## 2021-12-30 PROCEDURE — 74177 CT ABD & PELVIS W/CONTRAST: CPT | Mod: MA

## 2021-12-30 PROCEDURE — 84100 ASSAY OF PHOSPHORUS: CPT

## 2021-12-30 PROCEDURE — 85730 THROMBOPLASTIN TIME PARTIAL: CPT

## 2021-12-30 PROCEDURE — 93005 ELECTROCARDIOGRAM TRACING: CPT

## 2021-12-30 PROCEDURE — 82803 BLOOD GASES ANY COMBINATION: CPT

## 2021-12-30 PROCEDURE — 84145 PROCALCITONIN (PCT): CPT

## 2021-12-30 PROCEDURE — 84484 ASSAY OF TROPONIN QUANT: CPT

## 2021-12-30 PROCEDURE — 83605 ASSAY OF LACTIC ACID: CPT

## 2021-12-30 PROCEDURE — 71045 X-RAY EXAM CHEST 1 VIEW: CPT

## 2021-12-30 PROCEDURE — 80053 COMPREHEN METABOLIC PANEL: CPT

## 2021-12-30 PROCEDURE — 87040 BLOOD CULTURE FOR BACTERIA: CPT

## 2021-12-30 PROCEDURE — 83880 ASSAY OF NATRIURETIC PEPTIDE: CPT

## 2021-12-30 PROCEDURE — 82140 ASSAY OF AMMONIA: CPT

## 2021-12-30 PROCEDURE — 84443 ASSAY THYROID STIM HORMONE: CPT

## 2021-12-30 PROCEDURE — 82746 ASSAY OF FOLIC ACID SERUM: CPT

## 2021-12-30 PROCEDURE — 83550 IRON BINDING TEST: CPT

## 2021-12-30 PROCEDURE — 94660 CPAP INITIATION&MGMT: CPT

## 2021-12-30 PROCEDURE — 82607 VITAMIN B-12: CPT

## 2021-12-30 PROCEDURE — 94640 AIRWAY INHALATION TREATMENT: CPT

## 2021-12-30 PROCEDURE — 99291 CRITICAL CARE FIRST HOUR: CPT

## 2021-12-30 PROCEDURE — 80048 BASIC METABOLIC PNL TOTAL CA: CPT

## 2021-12-30 PROCEDURE — 85025 COMPLETE CBC W/AUTO DIFF WBC: CPT

## 2022-12-20 NOTE — ED ADULT NURSE NOTE - CAS TRG GENERAL NORM CIRC DET
MEDICARE WELLNESS VISIT NOTE      History of Present Illness:   Donita Andrew presents for her Subsequent Annual Medicare Wellness Visit.     She has complaints or concerns which include   Recovering from COVID infection, still a little residual cough.     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 She has. She can't recall which medication it was. Will call us when she gets home so we have accurate record.     IRON DEF Anemia.   Pharmacy did not dispense prescribed iron supplement.     CARDIAC  HOLTER, 7/20/2020:  1. This was a 24-hour Holter monitor performed for PVCs, with underlying sinus rhythm and average heart rate 71 bpm.   2. No patient-reported symptomatic transmissions.   3. Mild burden of supraventricular and very mild burden of ventricular ectopy, represented predominantly as isolated PACs and PVCs, respectively. No significant supraventricular or ventricular arrhythmia was identified.   4. No pauses and no atrial fibrillation were detected.     CT CHEST, 6/10/2020:  Stable CT scan of the chest with bilateral pulmonary nodules.     ECHO, 5/22/2020:  1. Hyperdynamic left ventricular systolic function with mid cavitary obstruction. With Valsalva the peak gradient is 16 mm Hg. No regional wall motion abnormalities.  2. Normal left ventricular size and wall thickness. Hypertrophy of the papillary muscles.  3. Grade I/IV diastolic dysfunction (abnormal relaxation filling pattern), normal to mildly elevated filling pressures.  4. Mildly increased left atrial size.  5. Normal right ventricular size and systolic function.  6. No hemodynamically significant valve abnormalities.     US CAROTID DUPLEX, 5/15/2020:  Mild atherosclerosis. Based on velocity criteria, there is an estimated less than 50% stenosis within the internal  carotid arteries bilaterally.     NM STRESS TEST, 1/30/2019:  1. Normal myocardial perfusion scans following a regadenoson injection and at rest.   2. Normal post-regadenoson LV systolic function.   3. Cardiac Risk Assessment: low     Arthritis  Left shoulder pain.  UNCHANGED   Not sleeping on that side.     Grandson lives with her. His name is lucy. He is 31 yo. He has lived with her since his mother passed at age 49. Since 2006.     Lungs - doing well. she was diagnosed with lung cancer 2018 at her screening chest CT. She smoked since age 21. Quit smoking 2018.   She has had the left hip replacements is her visit last year and this is doing well for her.    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 S/P CyberKnife radiosurgery (5400cGy/3fx), completed 3/5/18.    Patient Care Team:  Maryam Horne MD as PCP - General (Family Practice)  Windy Mitchell RN as Registered Nurse (Registered Nurse)  Yon Raines MD (Radiation Oncology)  Ruperto Valdovinos MD (Hematology & Oncology)  Chilango Rowe MD (Cardiovascular Disease)        Lab Services on 12/19/2022   Component Date Value Ref Range Status   • Iron 12/19/2022 32 (A)  50 - 170 mcg/dL Final   • Iron Binding Capacity 12/19/2022 260  250 - 450 mcg/dL Final   • Iron, Percent Saturation 12/19/2022 12 (A)  15 - 45 % Final   • Fasting Status 12/19/2022 12  0 - 999 Hours Final   • Sodium 12/19/2022 132 (A)  135 - 145 mmol/L Final   • Potassium 12/19/2022 4.0  3.4 - 5.1 mmol/L Final   • Chloride 12/19/2022 94 (A)  97 - 110 mmol/L Final   • Carbon Dioxide 12/19/2022 32  21 - 32 mmol/L Final   • Anion Gap 12/19/2022 10  7 - 19 mmol/L Final   • Glucose 12/19/2022 106 (A)  70 - 99 mg/dL Final   • BUN 12/19/2022 13  6 - 20 mg/dL Final   • Creatinine 12/19/2022 0.78  0.51 - 0.95 mg/dL Final   • Glomerular Filtration Rate 12/19/2022 69  >=60 Final    eGFR results = or >60 mL/min/1.73m2 = Normal kidney function. Estimated GFR calculated using the  CKD-EPI-R (2021) equation that does not include race in the creatinine calculation.   • BUN/ Creatinine Ratio 12/19/2022 17  7 - 25 Final   • Calcium 12/19/2022 9.2  8.4 - 10.2 mg/dL Final   • Bilirubin, Total 12/19/2022 0.4  0.2 - 1.0 mg/dL Final   • GOT/AST 12/19/2022 52 (A)  <=37 Units/L Final   • GPT/ALT 12/19/2022 28  <64 Units/L Final   • Alkaline Phosphatase 12/19/2022 98  45 - 117 Units/L Final   • Albumin 12/19/2022 2.7 (A)  3.6 - 5.1 g/dL Final   • Protein, Total 12/19/2022 7.0  6.4 - 8.2 g/dL Final   • Globulin 12/19/2022 4.3 (A)  2.0 - 4.0 g/dL Final   • A/G Ratio 12/19/2022 0.6 (A)  1.0 - 2.4 Final   • WBC 12/19/2022 7.2  4.2 - 11.0 K/mcL Final   • RBC 12/19/2022 4.37  4.00 - 5.20 mil/mcL Final   • HGB 12/19/2022 11.0 (A)  12.0 - 15.5 g/dL Final   • HCT 12/19/2022 35.9 (A)  36.0 - 46.5 % Final   • MCV 12/19/2022 82.2  78.0 - 100.0 fl Final   • MCH 12/19/2022 25.2 (A)  26.0 - 34.0 pg Final   • MCHC 12/19/2022 30.6 (A)  32.0 - 36.5 g/dL Final   • RDW-CV 12/19/2022 17.6 (A)  11.0 - 15.0 % Final   • RDW-SD 12/19/2022 52.9 (A)  39.0 - 50.0 fL Final   • PLT 12/19/2022 389  140 - 450 K/mcL Final   • NRBC 12/19/2022 0  <=0 /100 WBC Final   • Neutrophil, Percent 12/19/2022 51  % Final   • Lymphocytes, Percent 12/19/2022 17  % Final   • Mono, Percent 12/19/2022 28  % Final   • Eosinophils, Percent 12/19/2022 3  % Final   • Basophils, Percent 12/19/2022 1  % Final   • Immature Granulocytes 12/19/2022 0  % Final   • Absolute Neutrophils 12/19/2022 3.7  1.8 - 7.7 K/mcL Final   • Absolute Lymphocytes 12/19/2022 1.2  1.0 - 4.0 K/mcL Final   • Absolute Monocytes 12/19/2022 2.0 (A)  0.3 - 0.9 K/mcL Final   • Absolute Eosinophils  12/19/2022 0.2  0.0 - 0.5 K/mcL Final   • Absolute Basophils 12/19/2022 0.1  0.0 - 0.3 K/mcL Final   • Absolute Immmature Granulocytes 12/19/2022 0.0  0.0 - 0.2 K/mcL Final   Lab Services on 12/09/2022   Component Date Value Ref Range Status   • Microalbumin, Urine 12/09/2022 0.53  mg/dL Final    • Creatinine, Urine 12/09/2022 12.10  mg/dL Final   • Microalbumin/ Creatinine Ratio 12/09/2022 43.8 (A)  <30.0 mg/g Final    Short term hyperglycemia, exercise, urinary tract infections, marked hypertension, heart failure, and acute febrile illness can cause transient elevations in urinary albumin excretion. Due to marked day-to-day variability in albumin excretion, at least two of the three collections done in a 3 to 6 month period should show elevated levels before initially designating a patient as having microalbuminuria.   Lab Services on 11/18/2022   Component Date Value Ref Range Status   • Sodium 11/18/2022 129 (A)  135 - 145 mmol/L Final   • Potassium 11/18/2022 4.4  3.4 - 5.1 mmol/L Final   • Chloride 11/18/2022 95 (A)  97 - 110 mmol/L Final   • Carbon Dioxide 11/18/2022 30  21 - 32 mmol/L Final   • Anion Gap 11/18/2022 8  7 - 19 mmol/L Final   • Glucose 11/18/2022 99  70 - 99 mg/dL Final   • BUN 11/18/2022 16  6 - 20 mg/dL Final   • Creatinine 11/18/2022 0.66  0.51 - 0.95 mg/dL Final   • Glomerular Filtration Rate 11/18/2022 80  >=60 Final    eGFR results = or >60 mL/min/1.73m2 = Normal kidney function. Estimated GFR calculated using the CKD-EPI-R (2021) equation that does not include race in the creatinine calculation.   • BUN/ Creatinine Ratio 11/18/2022 24  7 - 25 Final   • Calcium 11/18/2022 9.1  8.4 - 10.2 mg/dL Final   • Bilirubin, Total 11/18/2022 0.5  0.2 - 1.0 mg/dL Final   • GOT/AST 11/18/2022 35  <=37 Units/L Final   • GPT/ALT 11/18/2022 24  <64 Units/L Final   • Alkaline Phosphatase 11/18/2022 102  45 - 117 Units/L Final   • Albumin 11/18/2022 2.6 (A)  3.6 - 5.1 g/dL Final   • Protein, Total 11/18/2022 6.9  6.4 - 8.2 g/dL Final   • Globulin 11/18/2022 4.3 (A)  2.0 - 4.0 g/dL Final   • A/G Ratio 11/18/2022 0.6 (A)  1.0 - 2.4 Final   • TSH 11/18/2022 2.487  0.350 - 5.000 mcUnits/mL Final   • Vitamin D, 25-Hydroxy 11/18/2022 85.4  30.0 - 100.0 ng/mL Final    <20 ng/mL  =  Vitamin D  deficiency  20-29 ng/mL  =  Vitamin D insufficiency   ng/mL  =  Optimal Vitamin D  >150 ng/mL  =  Possible toxicity   • Cholesterol 11/18/2022 79  <=199 mg/dL Final    Desirable         <200  Borderline High   200 to 239  High              >=240   • Triglycerides 11/18/2022 144  <=149 mg/dL Final    Normal            <150  Borderline High   150 to 199  High              200 to 499  Very High         >=500   • HDL 11/18/2022 <10 (A)  >=50 mg/dL Final    Low              <40  Borderline Low   40 to 49  Near Optimal     50 to 59  Optimal          >=60   • LDL 11/18/2022    Final    Unable to Calculate LDL   • Non-HDL Cholesterol 11/18/2022    Final    Unable to calculate due to low analyte concentration   • Cholesterol/ HDL Ratio 11/18/2022    Final    Unable to calculate due to low analyte concentration   • WBC 11/18/2022 9.4  4.2 - 11.0 K/mcL Final   • RBC 11/18/2022 3.95 (A)  4.00 - 5.20 mil/mcL Final   • HGB 11/18/2022 10.4 (A)  12.0 - 15.5 g/dL Final   • HCT 11/18/2022 32.9 (A)  36.0 - 46.5 % Final   • MCV 11/18/2022 83.3  78.0 - 100.0 fl Final   • MCH 11/18/2022 26.3  26.0 - 34.0 pg Final   • MCHC 11/18/2022 31.6 (A)  32.0 - 36.5 g/dL Final   • RDW-CV 11/18/2022 16.7 (A)  11.0 - 15.0 % Final   • RDW-SD 11/18/2022 50.2 (A)  39.0 - 50.0 fL Final   • PLT 11/18/2022 415  140 - 450 K/mcL Final   • NRBC 11/18/2022 0  <=0 /100 WBC Final   • Neutrophil, Percent 11/18/2022 64  % Final    Auto diff verified by manual slide review   • Lymphocytes, Percent 11/18/2022 9  % Final   • Mono, Percent 11/18/2022 22  % Final   • Eosinophils, Percent 11/18/2022 4  % Final   • Basophils, Percent 11/18/2022 1  % Final   • Immature Granulocytes 11/18/2022 0  % Final   • Absolute Neutrophils 11/18/2022 6.0  1.8 - 7.7 K/mcL Final   • Absolute Lymphocytes 11/18/2022 0.9 (A)  1.0 - 4.0 K/mcL Final   • Absolute Monocytes 11/18/2022 2.1 (A)  0.3 - 0.9 K/mcL Final   • Absolute Eosinophils  11/18/2022 0.4  0.0 - 0.5 K/mcL Final   •  Absolute Basophils 11/18/2022 0.1  0.0 - 0.3 K/mcL Final   • Absolute Immmature Granulocytes 11/18/2022 0.0  0.0 - 0.2 K/mcL Final      Patient Active Problem List    Diagnosis Date Noted   • Adenocarcinoma of right lung, stage 1 (CMS/[facility]) 12/19/2017     Priority: Medium   • PAF (paroxysmal atrial fibrillation) (CMS/[facility]) 04/08/2021     Priority: Low     noted on recent holter monitor. brief episode <30 seconds.      • Acute heart failure with preserved ejection fraction (HFpEF) (CMS/[facility]) 04/08/2021     Priority: Low   • Near syncope 04/08/2021     Priority: Low   • history non-small cell lung cancer 02/22/2021     Priority: Low   • Primary osteoarthritis of left shoulder 07/02/2020     Priority: Low   • Cerebral microvascular disease 05/14/2020     Priority: Low   • Lumbar spondylosis 07/26/2019     Priority: Low   • Status post left hip replacement 02/21/2019     Priority: Low   • Dry eyes 04/23/2018     Priority: Low   • Retinal pigment epithelial mottling of macula 04/23/2018     Priority: Low   • Lung nodule, multiple 11/21/2017     Priority: Low   • Adrenal nodule (CMS/[facility]) left 11/21/2017     Priority: Low   • Rupture of right long head biceps tendon 04/25/2017     Priority: Low   • SK (solar keratosis) 02/22/2017     Priority: Low   • Seborrheic keratosis, inflamed 02/22/2017     Priority: Low   • Arthralgia of right upper arm 02/07/2017     Priority: Low   • Hypothyroidism 01/22/2016     Priority: Low   • Quit smoking 04/26/2011     Priority: Low   • Benign hypertension      Priority: Low   • Pure hypercholesterolemia      Priority: Low   • Diverticulosis of colon (without mention of hemorrhage)      Priority: Low         Past Medical History:   Diagnosis Date   • Adenocarcinoma of lung (CMS/[facility]) 12/05/2017    right upper lobe   • Arthritis of hip - L, severe with mod Sx 7/8/2016   • Cancer of lower lobe of right lung (CMS/[facility]) 06/29/2021    left lower lobe    • CKD (chronic kidney disease) stage 2, GFR  60-89 ml/min 2019   • Congestive heart failure (CHF) (CMS/HCC) 2020   • Essential hypertension    • Herpes zoster without mention of complication 1995    L shoulder   • Osteoarthrosis, unspecified whether generalized or localized, unspecified site    • Pure hypercholesterolemia    • Reflux esophagitis    • Thyroid nodule 2017   • Tobacco use disorder 2011         Past Surgical History:   Procedure Laterality Date   • Appendectomy     • Colonoscopy diagnostic thru stoma include specimens  95,00,06    polyps in 95; none 00,06; extensive, diverticuloisis   • Dexa bone density axial skeleton  97,03    nl; T = +0.2 and 0.0   • Discission,2nd cataract,laser Bilateral 2021    Yag cap OU Dr Ruperto Horne   • Ekg report  03    nl   • Extracapsular cataract removal w insert io lens prosth wo ecp Right 2014    IOL right eye ZCBOO 22.0 Dr. Marcum   • Extracapsular cataract removal w insert io lens prosth wo ecp Left 2014    left GPS ZCBOO 21.5   • Joint replacement Left 2019   • Remove tonsils/adenoids,<13 y/o     • Total hip replacement Left 2019   • Total hip replacement Left 2019   • X-ray chest 2 vw  04    hyperexpanded lungs   • Xray mammogram screening bilat  99,00,01,02,03,04,05    nl         Social History     Tobacco Use   • Smoking status: Former     Packs/day: 0.50     Years: 66.00     Pack years: 33.00     Types: Cigarettes     Start date: 1951     Quit date: 10/20/2018     Years since quittin.1   • Smokeless tobacco: Never   • Tobacco comments:     3 cigarettes/day as of 2017; denies 19   Vaping Use   • Vaping Use: never used   Substance Use Topics   • Alcohol use: Yes     Alcohol/week: 1.0 standard drink     Types: 1 Glasses of wine per week     Comment: \"once in a blue moon\"   • Drug use: No     Comment: 19     Drug use:    Drug Use:    No                 Comment: 19    Family History - Reviewed    Current Outpatient  Medications   Medication Sig Dispense Refill   • amLODIPine (NORVASC) 5 MG tablet Take 1 tablet by mouth daily. 90 tablet 3   • candesartan-hydroCHLOROthiazide 32-25 MG tablet Take 1 tablet by mouth daily. 90 tablet 3   • atorvastatin (LIPITOR) 20 MG tablet Take 1 tablet by mouth daily. 90 tablet 3   • enalapril (VASOTEC) 20 MG tablet Take 1 tablet by mouth daily. 90 tablet 3   • furosemide (LASIX) 20 MG tablet Take 1 tablet by mouth daily. For ankle swelling, and blood pressure 90 tablet 3   • levothyroxine (SYNTHROID, LEVOTHROID) 50 MCG tablet Take 1 tablet by mouth daily. 90 tablet 3   • aspirin 81 MG EC tablet Take 1 tablet by mouth 2 times daily (with meals). (Patient taking differently: Take 81 mg by mouth daily. ) 60 tablet 0   • Glucosamine Sulfate 500 MG Tab Take 1 tablet by mouth 2 times daily.      • MAGNESIUM OXIDE PO Take by mouth daily.     • Omega-3 Fatty Acids (FISH OIL) 1000 MG capsule Take 1 g by mouth daily.      • Multiple Vitamins-Minerals (WOMENS 50+ MULTI VITAMIN/MIN) Tab Take 1 tablet by mouth daily. 30 tablet    • vitamin E 400 UNIT capsule Take 1 capsule by mouth daily.     • Biotin 1 MG Cap Take 1 capsule by mouth daily.      • Cholecalciferol (VITAMIN D) 2000 UNIT tablet Take 2,000 Units by mouth daily.  0   • Calcium Carb-Cholecalciferol (CALCIUM 1000 + D) 1000-800 MG-UNIT Tab Take 1 tablet by mouth daily.       No current facility-administered medications for this visit.         The following items on the Medicare Health Risk Assessment were found to be positive  11a.) Bladder Control problems (urine leaking): Often     11e.) Tiredness or Fatigue: Often     13.) Do you need help with any of the following activities?: Handle your own money, Prepare a meal, Do your housework or laundry, Go shopping for groceries or clothes     15.) How confident are you that you can control and manage most of your health problems?: Somewhat confident         Vision and hearing screens:   Hearing Screening  - Comments:: No problems  Vision Screening - Comments:: 2/5/22 Dr Ruffin Report in system  Advance Directive document: Yes  The patient has the following Advance Directive documents:  Living Will  Cognitive Assessment: no evidence of cognitive dysfunction by direct observation    Recent PHQ 2/9 Score    PHQ 2:  Date Adult PHQ 2 Score Adult PHQ 2 Interpretation   12/20/2022 0 No further screening needed       PHQ 9:       DEPRESSION ASSESSMENT/PLAN:  Depression screening is negative no further plan needed.     Body mass index is 29.19 kg/m².    BMI ASSESSMENT/PLAN:  she quit smoking in October. eating healthier, avoiding dessert, more veggies, fruits, no snacking,      Needed Screening/Treatment:   SEE ORDERS   Needed follow up:  None    ------------------------------------------------------------------------    REVIEW OF SYSTEMS: The patient DENIES symptoms of:   General: Fever, chills, night sweats, fatigue, weight loss, ecreased appetite.  Skin: Rash, itching, lumps or bumps or moles that are changing, hair changes, nail changes.  Eyes: Visual blurring, double vision, spots before the eyes, eye pain.  Ears: Decreased auditory acuity, ringing or tinnitus in the ears, pain in the ears, discharge from the ears.  Nose: Nosebleed, discharge from the nose, decrease in ability to smell.  Mouth: Soreness of the mouth or tongue or lips  Throat: Sore throat, hoarseness or voice change.  Neck: Stiffness or pain in the neck.  Breasts: Changes of the breasts, lumps or bumps in the breasts, discharge from the nipples, pain in the breasts, nipple changes.  Cardiorespiratory: Chest pain, edema, cough, hemoptysis, wheeze, shortness of breath.  Gastrointestinal: Abdominal pain, nausea, vomiting, constipation, diarrhea, black tarry stools, blood in the stools, change in the bowel habits, sour burps or heartburn, indigestion.  Genitourinary: Urgency, frequency, dysuria, hematuria, nocturia, vaginal discharge, vaginal itching,  abnormal vaginal bleeding or pain. Neurologic: Headache, weakness, loss of sensation, visual disturbance, trouble with balance or coordination, loss of memory.  Musculoskeletal: Joint pain, muscle pain.   Psychiatric: Symptoms of depression, feeling sad or blue.    PHYSICAL EXAM:    Visit Vitals  /60   Pulse 91   Temp 97.5 °F (36.4 °C) (Oral)   Ht 5' 1\" (1.549 m)   Wt 70.1 kg (154 lb 8 oz)   SpO2 93%   BMI 29.19 kg/m²      GENERAL APPEARANCE: pleasant well appearing 89 year old woman. She is in great vitality for her age.   SKIN: Normal color for ethnicity, normal texture, good turgor, no skin rashes, no atypical-appearing skin lesions, no bruises. Pink scaly spot central upper chest. AK on arms,   LYMPH NODES: No cervical, supraclavicular, axillary or inguinal adenopathy.   HEAD: Normocephalic.  EYES: Pupils are equal and reactive to light and accommodation, extraocular movements are full, sclerae and conjunctivae are normal, lids and lashes are normal.  Visual acuity above.  EARS: Pinnae and external ears are normal bilaterally, external auditory canals are normal, tympanic membranes are normal,  there is no tragal tenderness, auditory acuity is grossly intact.  See above.   NOSE: External nose is normal to inspection, no septal deviation.  MOUTH/THROAT: Tongue is midline and appears normal, oropharynx appears normal, soft palate and uvula are normal, oral mucosa is normal.  Dentition good.  NECK: Neck is supple, no thyromegaly, trachea is midline.  CHEST: Configuration normal, nontender to palpation, respiratory effort is not labored.  BREASTS: decl exam  LUNGS: Lungs are clear to auscultation with normal inspiratory/expiratory sounds, no rales, no rhonchi, no wheezes.  CARDIOVASCULAR: Normal point of maximal impulse, normal rate and rhythm, no palpable heaves or thrills, S1 and S2 normal, no S3 or S4, no murmurs, no extra heart sounds.   No carotid or abdominal bruits.  ABDOMEN: Abdomen is soft, normal  active bowel sounds, nontender, without masses, without hepatomegaly or splenomegaly, no pulsatile masses.  BACK: Inspection shows normal curvature, no discomfort to palpation of the midline, no costovertebral angle discomfort to palpation.  GENITALIA: decl exam  RECTAL: decl exam  EXTREMITIES: No clubbing, no cyanosis, , normal muscle tone and development bilaterally. Trace edema ankles.   left shoulder w decr ROM, crepitus with int/ ext rotation, unable to raise beyond 45 degrees, passively able to raise to 80 decrees.   Aching in bicep.   right bicep rupture - old, lump mid humerus.rom intact.   NEUROLOGIC:  Alert, appropriate, oriented x 3.  Speech fluent. Cranial nerves 2-12 intact, motor strength intact and symmetric, no apraxia or ataxia observed, deep tendon reflexes normal and symmetric, no tremor noted.   PSYCHIATRIC:  Affect appropriate, insight fair, mood good.    Donita was seen today for medicare wellness visit and imm/inj.  89 year old  woman who is doing amazingly well following having had right upper low lung cancer removed  She is cognitively intact and travels.      Left shoulder arthritis - had cortisone injection  Shedid see Orthopedics  injection or cortisone injection would provide her more comfort and ease range of motion.  With decline in her renal function I did caution her regarding use of nonsteroidal anti-inflammatories but certainly Tylenol safe to take for pain relief as needed.    Diagnoses and all orders for this visit:    Medicare annual wellness visit, subsequent    Well adult exam  -     PREV EST AGE >64    Benign hypertension  -     amLODIPine (NORVASC) 5 MG tablet; Take 1 tablet by mouth daily.  -   STOP  candesartan-hydroCHLOROthiazide 32-25 MG tablet; Take 1 tablet by mouth daily.  STOP  -     atorvastatin (LIPITOR) 20 MG tablet; Take 1 tablet by mouth daily.  Stable continue current treatment.    Non-small cell lung cancer, right (CMS/HCC)  Stable continue current treatment.       Adrenal nodule (CMS/HCC) left  Continue noninvasive monitoring  No comment on CT scan of the pelvis 06/16/2016    CT scan chest 11/09/2017  Left adrenal nodule, indeterminate on today's study. Recommend dedicated  pre and postcontrast adrenal protocol CT.     04/26/2019 CT scan chest  Upper abdomen unremarkable.  No significant change in a 18-20 mm left  adrenal adenoma, series 2 image 137      Nonobstructing nephrolithiasis.     Atherosclerotic vascular disease.     Chronic renal disease, stage 3, moderately decreased glomerular filtration rate (GFR) between 30-59 mL/min/1.73 square meter (CMS/HCC)  Caution with regard to use of nonsteroidal anti-inflammatories.  She is on an ACE-inhibitor for renal protection     Hypothyroidism due to medication  TSH (mcUnits/mL)   Date Value   11/18/2022 2.487    Stable continue current treatment. ;  Non-small cell lung cancer, right (CMS/HCC)  Contineus to do well and .fuwith onc.   Pure hypercholesterolemia  Stable continue current treatment.      Congestive heart failure, unspecified HF chronicity, unspecified heart failure type (CMS/HCC)  STABLE.      See orders.   See Patient Instructions section.   No follow-ups on file.   Strong peripheral pulses

## 2024-04-08 NOTE — ED ADULT NURSE NOTE - ISOLATION TYPE:
Per [facility] this medication DOES NOT require PA. Letter attached. Please advise patient. Thank you. None Statement Selected

## 2024-09-12 NOTE — INPATIENT CERTIFICATION FOR MEDICARE PATIENTS - NS ICMP CERT SIG IND
Chief complaint  Patient is a 48y old  Male who presents with a chief complaint of SOB (12 Sep 2024 13:14)         Labs and Fingersticks  CAPILLARY BLOOD GLUCOSE      POCT Blood Glucose.: 261 mg/dL (12 Sep 2024 12:11)  POCT Blood Glucose.: 140 mg/dL (12 Sep 2024 06:12)  POCT Blood Glucose.: 120 mg/dL (11 Sep 2024 23:59)  POCT Blood Glucose.: 125 mg/dL (11 Sep 2024 21:09)  POCT Blood Glucose.: 200 mg/dL (11 Sep 2024 16:49)  POCT Blood Glucose.: 219 mg/dL (11 Sep 2024 15:32)      Anion Gap: 14 (09-11 @ 08:10)      Calcium: 8.8 (09-11 @ 08:10)  Albumin: 3.5 (09-11 @ 08:10)    Alanine Aminotransferase (ALT/SGPT): 17 (09-11 @ 08:10)  Alkaline Phosphatase: 98 (09-11 @ 08:10)  Aspartate Aminotransferase (AST/SGOT): 21 (09-11 @ 08:10)        09-11    145  |  112<H>  |  52<H>  ----------------------------<  125<H>  4.6   |  19<L>  |  2.78<H>    Ca    8.8      11 Sep 2024 08:10    TPro  6.6  /  Alb  3.5  /  TBili  0.8  /  DBili  0.2  /  AST  21  /  ALT  17  /  AlkPhos  98  09-11                        8.8    8.38  )-----------( 273      ( 11 Sep 2024 08:10 )             27.0     Medications  MEDICATIONS  (STANDING):  albuterol    90 MICROgram(s) HFA Inhaler 2 Puff(s) Inhalation every 8 hours  albuterol/ipratropium for Nebulization 3 milliLiter(s) Nebulizer every 6 hours  amLODIPine   Tablet 10 milliGRAM(s) Oral daily  aspirin  chewable 81 milliGRAM(s) Oral daily  atorvastatin 40 milliGRAM(s) Oral at bedtime  carvedilol 12.5 milliGRAM(s) Oral every 12 hours  dexAMETHasone     Tablet 6 milliGRAM(s) Oral daily  dextrose 5%. 1000 milliLiter(s) (50 mL/Hr) IV Continuous <Continuous>  dextrose 5%. 1000 milliLiter(s) (100 mL/Hr) IV Continuous <Continuous>  dextrose 50% Injectable 12.5 Gram(s) IV Push once  dextrose 50% Injectable 25 Gram(s) IV Push once  dextrose 50% Injectable 25 Gram(s) IV Push once  epoetin lorenzo (EPOGEN) Injectable 31001 Unit(s) SubCutaneous once  furosemide    Tablet 20 milliGRAM(s) Oral daily  glucagon  Injectable 1 milliGRAM(s) IntraMuscular once  heparin   Injectable 5000 Unit(s) SubCutaneous every 12 hours  hydrALAZINE 25 milliGRAM(s) Oral three times a day  insulin lispro (ADMELOG) corrective regimen sliding scale   SubCutaneous every 6 hours  isosorbide   dinitrate Tablet (ISORDIL) 20 milliGRAM(s) Oral three times a day  melatonin 3 milliGRAM(s) Oral at bedtime  pancrelipase (VIOKACE) for Occluded enteral feeding tubes 1 Tablet(s) Enteral Tube once  pantoprazole   Suspension 40 milliGRAM(s) Enteral Tube daily  polyethylene glycol 3350 17 Gram(s) Oral daily  QUEtiapine 50 milliGRAM(s) Oral at bedtime  QUEtiapine 25 milliGRAM(s) Oral daily  sodium bicarbonate 650 milliGRAM(s) Oral three times a day      Physical Exam  General: Patient comfortable in bed   Vital Signs Last 12 Hrs  T(F): 98.7 (09-12-24 @ 13:20), Max: 99.3 (09-12-24 @ 08:27)  HR: 90 (09-12-24 @ 13:20) (85 - 90)  BP: 159/85 (09-12-24 @ 13:20) (149/78 - 160/87)  BP(mean): --  RR: 18 (09-12-24 @ 13:20) (18 - 18)  SpO2: 97% (09-12-24 @ 13:20) (97% - 98%)    CVS: S1S2   Respiratory: No wheezing, no crepitations  GI: Abdomen soft, bowel sounds positive  Musculoskeletal:  moves all extremities  : Voiding      I certify as stated above.     Chief complaint  Patient is a 48y old  Male who presents with a chief complaint of SOB (12 Sep 2024 13:14)     Labs and Fingersticks  CAPILLARY BLOOD GLUCOSE      POCT Blood Glucose.: 261 mg/dL (12 Sep 2024 12:11)  POCT Blood Glucose.: 140 mg/dL (12 Sep 2024 06:12)  POCT Blood Glucose.: 120 mg/dL (11 Sep 2024 23:59)  POCT Blood Glucose.: 125 mg/dL (11 Sep 2024 21:09)  POCT Blood Glucose.: 200 mg/dL (11 Sep 2024 16:49)  POCT Blood Glucose.: 219 mg/dL (11 Sep 2024 15:32)      Anion Gap: 14 (09-11 @ 08:10)      Calcium: 8.8 (09-11 @ 08:10)  Albumin: 3.5 (09-11 @ 08:10)    Alanine Aminotransferase (ALT/SGPT): 17 (09-11 @ 08:10)  Alkaline Phosphatase: 98 (09-11 @ 08:10)  Aspartate Aminotransferase (AST/SGOT): 21 (09-11 @ 08:10)        09-11    145  |  112<H>  |  52<H>  ----------------------------<  125<H>  4.6   |  19<L>  |  2.78<H>    Ca    8.8      11 Sep 2024 08:10    TPro  6.6  /  Alb  3.5  /  TBili  0.8  /  DBili  0.2  /  AST  21  /  ALT  17  /  AlkPhos  98  09-11                        8.8    8.38  )-----------( 273      ( 11 Sep 2024 08:10 )             27.0     Medications  MEDICATIONS  (STANDING):  albuterol    90 MICROgram(s) HFA Inhaler 2 Puff(s) Inhalation every 8 hours  albuterol/ipratropium for Nebulization 3 milliLiter(s) Nebulizer every 6 hours  amLODIPine   Tablet 10 milliGRAM(s) Oral daily  aspirin  chewable 81 milliGRAM(s) Oral daily  atorvastatin 40 milliGRAM(s) Oral at bedtime  carvedilol 12.5 milliGRAM(s) Oral every 12 hours  dexAMETHasone     Tablet 6 milliGRAM(s) Oral daily  dextrose 5%. 1000 milliLiter(s) (50 mL/Hr) IV Continuous <Continuous>  dextrose 5%. 1000 milliLiter(s) (100 mL/Hr) IV Continuous <Continuous>  dextrose 50% Injectable 12.5 Gram(s) IV Push once  dextrose 50% Injectable 25 Gram(s) IV Push once  dextrose 50% Injectable 25 Gram(s) IV Push once  epoetin lorenzo (EPOGEN) Injectable 80180 Unit(s) SubCutaneous once  furosemide    Tablet 20 milliGRAM(s) Oral daily  glucagon  Injectable 1 milliGRAM(s) IntraMuscular once  heparin   Injectable 5000 Unit(s) SubCutaneous every 12 hours  hydrALAZINE 25 milliGRAM(s) Oral three times a day  insulin lispro (ADMELOG) corrective regimen sliding scale   SubCutaneous every 6 hours  isosorbide   dinitrate Tablet (ISORDIL) 20 milliGRAM(s) Oral three times a day  melatonin 3 milliGRAM(s) Oral at bedtime  pancrelipase (VIOKACE) for Occluded enteral feeding tubes 1 Tablet(s) Enteral Tube once  pantoprazole   Suspension 40 milliGRAM(s) Enteral Tube daily  polyethylene glycol 3350 17 Gram(s) Oral daily  QUEtiapine 50 milliGRAM(s) Oral at bedtime  QUEtiapine 25 milliGRAM(s) Oral daily  sodium bicarbonate 650 milliGRAM(s) Oral three times a day      Physical Exam  General: Patient comfortable in bed   Vital Signs Last 12 Hrs  T(F): 98.7 (09-12-24 @ 13:20), Max: 99.3 (09-12-24 @ 08:27)  HR: 90 (09-12-24 @ 13:20) (85 - 90)  BP: 159/85 (09-12-24 @ 13:20) (149/78 - 160/87)  BP(mean): --  RR: 18 (09-12-24 @ 13:20) (18 - 18)  SpO2: 97% (09-12-24 @ 13:20) (97% - 98%)    CVS: S1S2   Respiratory: No wheezing, no crepitations  GI: Abdomen soft, bowel sounds positive  Musculoskeletal:  moves all extremities  : Voiding
